# Patient Record
Sex: MALE | Race: WHITE | Employment: OTHER | URBAN - METROPOLITAN AREA
[De-identification: names, ages, dates, MRNs, and addresses within clinical notes are randomized per-mention and may not be internally consistent; named-entity substitution may affect disease eponyms.]

---

## 2017-12-19 ENCOUNTER — HOSPITAL ENCOUNTER (OUTPATIENT)
Dept: OTHER | Age: 67
Discharge: HOME | End: 2017-12-19
Attending: INTERNAL MEDICINE | Admitting: INTERNAL MEDICINE

## 2017-12-19 DIAGNOSIS — R06.02 BREATH SHORTNESS: ICD-10-CM

## 2018-08-14 ENCOUNTER — OFFICE VISIT (OUTPATIENT)
Dept: ORTHOPEDIC SURGERY | Age: 68
End: 2018-08-14

## 2018-08-14 VITALS
HEIGHT: 72 IN | SYSTOLIC BLOOD PRESSURE: 105 MMHG | HEART RATE: 82 BPM | DIASTOLIC BLOOD PRESSURE: 59 MMHG | BODY MASS INDEX: 20.99 KG/M2 | WEIGHT: 155 LBS

## 2018-08-14 DIAGNOSIS — M75.82 ROTATOR CUFF TENDINITIS, LEFT: Primary | ICD-10-CM

## 2018-08-14 DIAGNOSIS — M25.512 LEFT SHOULDER PAIN, UNSPECIFIED CHRONICITY: ICD-10-CM

## 2018-08-14 PROCEDURE — 99203 OFFICE O/P NEW LOW 30 MIN: CPT | Performed by: ORTHOPAEDIC SURGERY

## 2018-08-14 PROCEDURE — 20610 DRAIN/INJ JOINT/BURSA W/O US: CPT | Performed by: ORTHOPAEDIC SURGERY

## 2018-08-14 RX ORDER — MINOXIDIL 2.5 MG/1
2.5 TABLET ORAL DAILY
COMMUNITY

## 2018-08-14 RX ORDER — MELOXICAM 15 MG/1
15 TABLET ORAL DAILY
Qty: 30 TABLET | Refills: 3 | Status: SHIPPED | OUTPATIENT
Start: 2018-08-14 | End: 2019-02-28

## 2018-08-14 NOTE — PROGRESS NOTES
Depomedrol     NDC#: 9412-7567-03  Lot: [id number]  Expiration Date: 08/20  Dose: 2cc  Location: injection was given in Left  shoulder      Naropin (Ropivacain)    NDC#: 31269-965-85  Lot: [id number]  Expiration Date: 02/22  Dose:8cc  Location: injection was given in Left shoulder

## 2018-08-14 NOTE — PROGRESS NOTES
ADENOIDECTOMY       Social History     Social History    Marital status:      Spouse name: N/A    Number of children: N/A    Years of education: N/A     Occupational History    Not on file. Social History Main Topics    Smoking status: Former Smoker     Years: 13.00    Smokeless tobacco: Never Used    Alcohol use No    Drug use: No    Sexual activity: Not on file     Other Topics Concern    Not on file     Social History Narrative    No narrative on file       No Known Allergies  Current Outpatient Prescriptions on File Prior to Visit   Medication Sig Dispense Refill    traZODone (DESYREL) 50 MG tablet Take 50 mg by mouth nightly      ibuprofen (ADVIL) 200 MG CAPS Take 1 capsule by mouth 3 times daily.  lisinopril (PRINIVIL;ZESTRIL) 5 MG tablet Take 5 mg by mouth daily.  rosuvastatin (CRESTOR) 40 MG tablet Take 40 mg by mouth every evening.  pantoprazole (PROTONIX) 40 MG tablet Take 40 mg by mouth daily.  dicyclomine (BENTYL) 20 MG tablet Take 20 mg by mouth every 6 hours.  aspirin 81 MG tablet Take 81 mg by mouth daily.  GLUCOSAMINE HCL PO Take  by mouth. No current facility-administered medications on file prior to visit.           Review of Systems:  A 14 point review of systems available in the scanned medical record, dated 8/14/2018, under the media tab, as documented by the patient. The review is negative with the exception of those things mentioned in the HPI and Past Medical History. Vital Signs:  Vitals:    08/14/18 1352   BP: (!) 105/59   Pulse: 82       General Exam:   Well nourished, and groomed. Awake, Alert, Oriented to Person/Place/Time  No acute distress    Upper Extremity:    Left shoulder exam    Inspection:  No gross deformities, periscapular musculature is symmetry without atrophy, no obvious winging    Palpation: he is tender under the acromion and over the supraspinatus tendon. No biceps or AC tenderness.  He has tenderness over the posterior capsule that is worsened with cross body adduction. Subacromial crepitation that is not painful noted with internal rotation in abduction. Active/Passive ROM:     F: 160/170  A: 130/140  E: 45/50  I: T10/Tll    Strength: 4/5 strength testing of supraspinatus; no other appreciable deficits    Stability: negative sulcus sign, no evidence of instability    Neurovascular:   2+ Radial pulses with capillary refill brisk <2 seconds. 2/2 sensation to light touch in C5-T1 distributions tested. Special Tests:    Patient has a positive Jobes but negative neer and hawkin's examination  No appreciable lag signs  No labral signs. Right comparison shoulder exam    Inspection:  No gross deformities, periscapular musculature is symmetrical without atrophy, no obvious winging    Palpation: Nontender to palpation about the acromioclavicular joint, rotator cuff footprint or over the biceps groove, or any other bony architecture    Active/Passive ROM: full in forward flexion, abduction, external rotation with the elbow at the side, and internal rotation    Strength: 5/5 strength testing of deltoid, supraspinatus, infraspinatus, teres minor, and subscapularis    Stability: negative sulcus sign, no evidence of instability    Neurovascular: Neurovascularly intact      Imaging:       AP, Y, and Ax Lat of the left shoulder with 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 No acute fracture or dislocation.  Os Acromiale    Office Procedures:  Orders Placed This Encounter   Procedures    XR SHOULDER LEFT (MIN 2 VIEWS)     Order Specific Question:   Reason for exam:     Answer:   exam    Ambulatory referral to Physical Therapy     Referral Priority:   Routine     Referral Type:   Eval and Treat     Referral Reason:   Specialty Services Required     Requested Specialty:   Physical Therapy     Number of Visits Requested:   1    [place] ARTHROCENTESIS ASPIR&/INJ

## 2018-08-14 NOTE — LETTER
Shoulder Elbow Rehabilitation Referral    Patient Name: Gina Jha      YOB: 1950    Diagnosis: Subacromial impingement, bursitis and supraspinatus tendonitis    Precautions: none    Post Op Instructions:  [] Continuous passive motion (CPM)  [] Elbow range of motion  [] Exercise in plane of scapula  []  Strengthening     [] Pulley and instruction   [] Home exercise program (copy to patient)   [] Sling when arm at risk  [] Sling or brace at all times   [] AAROM: Forward elevation to              [] AAROM: External rotation  To      [] Isometric external rotator strengthening [] AAROM: internal rotation: up the back  [] Isometric abductor strengthening  [] AAROM: Internal abduction     [] Isometric internal rotator strengthening [] AAROM: cross-body adduction             Stretching:     Strengthening:  [x] Four quadrant (FE, ER, IR, CBA)  [x] Sleeper stretch    [x] Rotator cuff (ER, IR, Abd)  [] Forward Elevation    [x] External Rotators     [] External Rotation    [x] Internal Rotators  [] Internal Rotation: up/back   [x] Abductors     [] Internal Rotation: supine in abduction [x] Flexors  [x] Cross-body abduction    [x] Extensors  [] Pendulum (FE, Abd/Add, cw/ccw)  [x] Scapular Stabilizers   [] Wall-walking (FE, Abd)    [x] Shoulder shrugs     [] Table slides      [x] Rhomboid pinch  [] Elbow (flex, ext, pron, sup)    [x] Lat.  Pull downs     [] Medial epicondylitis program   [x] Forward punch   [] Lateral epicondylitis program   [x] Internal rotators     [] Progressive resistive exercises  [] Bench Press        [] Bench press plus  Activities:     [x] Lateral pull-downs  [] Rowing     [x] Progressive two-hand supine press  [] Stepper/Exercise bike   [x] Biceps: curls/supination  [] Swimming  [] Water exercises    Modalities:     Return to Sport:  [] Ultrasound     [] Plyometrics  [] Iontophoresis    [] Rhythmic stabilization  [] Moist heat     [] Core strengthening [] Massage     [] Sports specific program:      [] Cryotherapy      [] Electrical stimulation     [] Paraffin  [] Whirlpool  [] TENS    [x] Home exercise program (copy to patient). [x] Supervised physical therapy  Frequency: []  1x week  [x] 2x week  [] 3x week  [] Other:   Duration: [] 2 weeks   [] 4 weeks  [x] 6 weeks  [] Other:     Sincerely,    Ashleigh Lehman MD  Clinical Fellow in [address] [place] [address]    This dictation was performed with a verbal recognition program [facility]) and it was checked for errors. It is possible that there are still dictated errors within this office note. If so, please bring any errors to my attention for an addendum. All efforts were made to ensure that this office note is accurate. Attestation:  I, Dr. Jessica Elliott, personally supervised the Orthopaedic Sports Medicine Fellow in the evaluation and development of a treatment plan for this patient. I personally interviewed the patient and performed a physical examination. In addition, I discussed the patient's condition and treatment options with them. I have also reviewed and agree with the past medical, family and social history unless otherwise noted. All of the patient's questions were answered.

## 2018-09-11 ENCOUNTER — HOSPITAL ENCOUNTER (OUTPATIENT)
Dept: PHYSICAL THERAPY | Age: 68
Setting detail: THERAPIES SERIES
Discharge: HOME | End: 2018-09-11
Payer: COMMERCIAL

## 2018-09-11 PROCEDURE — G8982 BODY POS GOAL STATUS: HCPCS | Performed by: PHYSICAL THERAPIST

## 2018-09-11 PROCEDURE — 97161 PT EVAL LOW COMPLEX 20 MIN: CPT | Performed by: PHYSICAL THERAPIST

## 2018-09-11 PROCEDURE — G8981 BODY POS CURRENT STATUS: HCPCS | Performed by: PHYSICAL THERAPIST

## 2018-09-11 PROCEDURE — 97530 THERAPEUTIC ACTIVITIES: CPT | Performed by: PHYSICAL THERAPIST

## 2018-09-11 PROCEDURE — 97110 THERAPEUTIC EXERCISES: CPT | Performed by: PHYSICAL THERAPIST

## 2018-09-11 NOTE — FLOWSHEET NOTE
Special Tests Results/Comment   Kathrine-Christian Mild pain on L   Neers -   Painful Arc -   OBriens Mild pain on L   Speeds Slight discomfort on L   Apley's Scratch [place]: T10 on R, T 12 on L  BB: T4 on R, T3 on L   Belly Press Mild pain on L   Drop Arm Test -   Full Can -   Hornblower's -      Reflexes/Sensation:               [x]Dermatomes/Myotomes intact               [x]Reflexes equal and normal bilaterally              []Other:     Joint mobility:               [x]Normal               []Hypo              []Hyper     Palpation: TTP LH BT and medial border of scap, equal B     Functional Mobility/Transfers: Independent     Posture: Rounded shoulders      RESTRICTIONS/PRECAUTIONS:     Exercises/Interventions:   Exercise/Equipment Resistance/Repetitions Other comments   Stretching/PROM     Wand     Table Slides     UE [place]     Pulleys     Pendulum     BB IR 3x30\"         PRE's     Flexion     Abduction     External Rotation 3x10 2# Sidelying   Internal Rotation     Shrugs     EXT     Reverse Flys     Serratus     Prone T 3x10 0# Workout bench   Prone Y 3x10 0# Workout bench   Biceps     Triceps     Retraction          Cable Column/Theraband     External Rotation 3x10 green    Internal Rotation 3x10 black    Shrugs     Lats     Ext     Flex     Scapular Retraction     BIC     TRIC     PNF     \"W\" ER 3x10 green              Dynamic Stability          Plyoback            Plan for next session: progress as tolerated    Patient Education:  9/11/18 Pt educated on diagnosis, prognosis and PT plan of care. Educated on [address]. Pt questions were addressed and answered.     Therapeutic Exercise and NMR EXR:  [x] (11269) Provided verbal/tactile cueing for activities related to strengthening, flexibility, endurance, ROM  for improvements in scapular, scapulothoracic and UE control with self care, reaching, carrying, lifting, house/yardwork, driving/computer work.    [] (80399) Provided verbal/tactile cueing for activities

## 2018-09-11 NOTE — PLAN OF CARE
The [address] [place] [address] and [facility]      Physical Therapy Certification    Dear Referring Practitioner: Severo Peters MD,    We had the pleasure of evaluating the following patient for physical therapy services at [address] [place] [address]. A summary of our findings can be found in the initial assessment below. This includes our plan of care. If you have any questions or concerns regarding these findings, please do not hesitate to contact me at the office phone number checked above. Thank you for the referral.       Physician Signature:_______________________________Date:__________________  By signing above (or electronic signature), therapists plan is approved by physician      Patient: Jignesh Mann   : 1950   MRN: [id number]  Referring Physician: Referring Practitioner: Severo Peters MD      Evaluation Date: 2018      Medical Diagnosis Information:  Diagnosis: M75.82 (ICD-10-CM) - Rotator cuff tendinitis, left; M25.512 (ICD-10-CM) - Left shoulder pain, unspecified chronicity   Treatment Diagnosis: L shoulder pain, kyphotic posture, RC and trapezius weakness                                         Insurance information: PT Insurance Information: PT BENEFITS 2018 FACILITY/ [employer]/ EFFECTIVE 1-14-15/ ACTIVE/ DED 1500 MET/ PAYS 80%/ OOP 4500 MET 3062.07/60 VPCY/ 0 AUTH/ REF# VEDA [id number]/ 9-10-18 PAG    Precautions/ Contra-indications:   Latex Allergy:  [x]NO      []YES  Preferred Language for Healthcare:   [x]English       []other:    SUBJECTIVE: Patient stated complaint: Pt presents to PT with chronic L shoulder pain. He has had B SAD (R in , L about 12 years ago), but otherwise does not recall specific injury to the shoulder. Pt remains very active and he states that he began noticing bench pressing was getting hard. He typically can bench 125 lbs but had reached the point where event the bar was painful.  Pt states that he is able to complete all ADLs and IADLs, but had backed off on workouts and held all together until he came to PT. Pt saw Dr. Elva Hicks last week and had cortisone injection which he feels has helped some, notes a little more mobility (can reach behind back better) and a little less pain. Pt states his pain is very specific it, it is caused by reaching out to the side and reaching behind back, he does have occasional pain along the lateral proximal arm.    -instability  -clicking/catching  +night pain (on occasion when sleeping on L, but primarily sleeps on his R)    5/10 before injection, sharp grabbing pain  2-3/10 after injection    Hobbies: working out, tennis (pt tore HS in Feb 2017, went through PT but has not resumed tennis as he is afraid of reinjury)    Relevant Medical History: see intake form  Functional Disability Index:PT G-Codes  Functional Assessment Tool Used: UEFI  Score: 73/80=91.25% (8.75% deficit)  Functional Limitation: Changing and maintaining body position  Changing and Maintaining Body Position Current Status (): At least 1 percent but less than 20 percent impaired, limited or restricted  Changing and Maintaining Body Position Goal Status (): 0 percent impaired, limited or restricted    Pain Scale: 2-4/10  Easing factors: cortisone injection  Provocative factors: reaching from ABD positioning, reaching behind back    Type: []Constant   [x]Intermittent  []Radiating []Localized []other:     Numbness/Tingling: No    Occupation/School: Dermatologist    Living Status/Prior Level of Function: Independent with ADLs and IADLs.     OBJECTIVE:     ROM PROM AROM  Comment    L R L R    Flexion   155     Abduction   160     ER   90     IR   50         Strength L R Comment   Flexion 4/5 4/5    Abduction 4/5 4/5    ER 4-/5 4/5    IR 4+/5 4/5    Upper Trap 4-/5 4/5    Lower Trap 4-/5 4/5    Mid Trap 4-/5 4/5    Biceps 4+/5 4+/5    Triceps 4+/5 4+/5      Special Tests Results/Comment   Kathrine-Christian Mild pain on L of Motivation                        []Co-Morbidities              []Cognitive Function, education/learning barriers              []Environmental, home barriers              []profession/work barriers  []past PT/medical experience  [x]other:  Justification: Pt is motivated to get better and is willing to try PT and states that he will be compliant with HEP; however, he does not feel that PT is going help and he does not want to complete MD referral of 6 weeks. Falls Risk Assessment (30 days):   [x] Falls Risk assessed and no intervention required. [] Falls Risk assessed and Patient requires intervention due to being higher risk   TUG score (>12s at risk):     [] Falls education provided, including       G-Codes:  PT G-Codes  Functional Assessment Tool Used: UEFI  Score: 73/80=91.25% (8.75% deficit)  Functional Limitation: Changing and maintaining body position  Changing and Maintaining Body Position Current Status ():  At least 1 percent but less than 20 percent impaired, limited or restricted  Changing and Maintaining Body Position Goal Status (): 0 percent impaired, limited or restricted    ASSESSMENT:   Functional Impairments   []Noted spinal or UE joint hypomobility   []Noted spinal or UE joint hypermobility   [x]Decreased UE functional ROM   [x]Decreased UE functional strength   []Abnormal reflexes/sensation/myotomal/dermatomal deficits   []Decreased RC/scapular/core strength and neuromuscular control   []other:      Functional Activity Limitations (from functional questionnaire and intake)   []Reduced ability to tolerate prolonged functional positions   [x]Reduced ability or difficulty with changes of positions or transfers between positions   []Reduced ability to maintain good posture and demonstrate good body mechanics with sitting, bending, and lifting   [] Reduced ability or tolerance with driving and/or computer work   []Reduced ability to sleep   [x]Reduced ability to perform lifting, reaching, carrying tasks   []Reduced ability to tolerate impact through UE   [x]Reduced ability to reach behind back   []Reduced ability to  or hold objects   []Reduced ability to throw or toss an object   []other:      Participation Restrictions   [x]Reduced participation in self care activities   []Reduced participation in home management activities   []Reduced participation in work activities   []Reduced participation in social activities. [x]Reduced participation in sport / recreational activities. Classification:   []Signs/symptoms consistent with post-surgical status including decreased ROM, strength and function.   []Signs/symptoms consistent with joint sprain/strain   [x]Signs/symptoms consistent with shoulder impingement   []Signs/symptoms consistent with shoulder/elbow/wrist tendinopathy   []Signs/symptoms consistent with Rotator cuff tear   []Signs/symptoms consistent with labral tear   []Signs/symptoms consistent with postural dysfunction    []Signs/symptoms consistent with Glenohumeral IR Deficit - <45 degrees   []Signs/symptoms consistent with facet dysfunction of cervical/thoracic spine    []Signs/symptoms consistent with pathology which may benefit from Dry needling     []other:     Prognosis/Rehab Potential:      []Excellent   [x]Good    []Fair   []Poor    Tolerance of evaluation/treatment:    []Excellent   [x]Good    []Fair   []Poor    Physical Therapy Evaluation Complexity Justification  [x] A history of present problem with:  [x] no personal factors and/or comorbidities that impact the plan of care;  []1-2 personal factors and/or comorbidities that impact the plan of care  []3 personal factors and/or comorbidities that impact the plan of care  [x] An examination of body systems using standardized tests and measures addressing any of the following: body structures and functions (impairments), activity limitations, and/or participation restrictions;:  [] a total of 1-2 or more elements   [] a total of 3 or more elements   [x] a total of 4 or more elements   [x] A clinical presentation with:  [x] stable and/or uncomplicated characteristics   [] evolving clinical presentation with changing characteristics  [] unstable and unpredictable characteristics;   [x] Clinical decision making of [x] low, [] moderate, [] high complexity using standardized patient assessment instrument and/or measurable assessment of functional outcome. [x] EVAL (LOW) 74469 (typically 20 minutes face-to-face)  [] EVAL (MOD) 20124 (typically 30 minutes face-to-face)  [] EVAL (HIGH) 94954 (typically 45 minutes face-to-face)  [] RE-EVAL       PLAN:  Frequency/Duration:  1 days per week for 6 Weeks:  INTERVENTIONS:  [x] Therapeutic exercise including: strength training, ROM, for Upper extremity and core   [x]  NMR activation and proprioception for UE, scap and Core   [x] Manual therapy as indicated for shoulder, scapula and spine to include: Dry Needling/IASTM, STM, PROM, Gr I-IV mobilizations, manipulation. [x] Modalities as needed that may include: thermal agents, E-stim, Biofeedback, US, iontophoresis as indicated  [x] Patient education on joint protection, postural re-education, activity modification, progression of HEP. HEP instruction:   Initiated 9/11/18. Printed hand out given. Pt demonstrated proper form of each exercise and expressed verbal understanding of frequency and duration. GOALS:  Patient stated goal: Decrease pain    Therapist goals for Patient:   Short Term Goals: To be achieved in: 2 weeks  1. Independent in HEP and progression per patient tolerance, in order to prevent re-injury. 2. Patient will have a decrease in pain to facilitate improvement in movement, function, and ADLs as indicated by Functional Deficits. Long Term Goals: To be achieved in: 6 weeks  1. Disability index score of 10% or less for the UEFS to assist with reaching prior level of function.    2. Patient will demonstrate increased AROM to WNL to allow for proper joint functioning as indicated by patients Functional Deficits. 3. Patient will demonstrate an increase in LUE strength to 4+/5 to allow for proper functional mobility as indicated by patients Functional Deficits. 4. Patient will return to ADLs, IADLs and functional activities without increased symptoms or restriction. 5. Patient will tolerate progressive return to full gym program without increased symptoms or restriction. Electronically signed by:  Karina Donaldson PT, DPT  Physical Therapist  [id number]  [email or web address]

## 2018-09-14 ENCOUNTER — HOSPITAL ENCOUNTER (OUTPATIENT)
Dept: PHYSICAL THERAPY | Age: 68
Setting detail: THERAPIES SERIES
Discharge: HOME | End: 2018-09-14
Payer: COMMERCIAL

## 2018-09-14 PROCEDURE — 97110 THERAPEUTIC EXERCISES: CPT | Performed by: PHYSICAL THERAPIST

## 2018-09-14 PROCEDURE — 97530 THERAPEUTIC ACTIVITIES: CPT | Performed by: PHYSICAL THERAPIST

## 2018-09-14 NOTE — FLOWSHEET NOTE
[address] [facility]    [facility] Therapy Daily Treatment Note  Date:  2018    Patient Name:  Colin Mackenzie    :  1950  MRN: [id number]  Restrictions/Precautions:    Medical/Treatment Diagnosis Information:  · Diagnosis: M75.82 (ICD-10-CM) - Rotator cuff tendinitis, left; M25.512 (ICD-10-CM) - Left shoulder pain, unspecified chronicity  · Treatment Diagnosis: L shoulder pain, kyphotic posture, RC and trapezius weakness  Insurance/Certification information:  PT Insurance Information: PT BENEFITS 2018 FACILITY/ AETNA/ EFFECTIVE 1-14-15/ ACTIVE/ DED 1500 MET/ PAYS 80%/ OOP 4500 MET 3062. VPCY/ 0 AUTH/ REF# VEDA [id number]/ 9-10-18 PAG  Physician Information:  Referring Practitioner: Mayuri Mitchell MD  Plan of care signed (Y/N):     Date of Patient follow up with Physician: Not scheduled at this time    G-Code (if applicable):      Date G-Code Applied:  18  PT G-Codes  Functional Assessment Tool Used: UEFI  Score: 73/80=91.25% (8.75% deficit)  Functional Limitation: Changing and maintaining body position  Changing and Maintaining Body Position Current Status (): At least 1 percent but less than 20 percent impaired, limited or restricted  Changing and Maintaining Body Position Goal Status (): 0 percent impaired, limited or restricted    Progress Note: [x]  Yes  []  No  Next due by: Visit #10      Latex Allergy:  [x]NO      []YES  Preferred Language for Healthcare:   [x]English       []other:    Visit # Insurance Allowable   2 60     Pain level:  3/10     SUBJECTIVE:  Pt states that HEP went well. Pt states that his shoulder feels good today, still has pain with reaching.     OBJECTIVE:                ROM PROM AROM  Comment     L R L R     Flexion     155       Abduction     160       ER     90       IR     50             Strength L R Comment   Flexion 4/5 4/5     Abduction 4/5 4/5     ER 4-/5 4/5     IR 4+/5 4/5     Upper Trap 4-/5 4/5   reaching, carrying, lifting, house/yardwork, driving/computer work.    [] (86580) Provided verbal/tactile cueing for activities related to improving balance, coordination, kinesthetic sense, posture, motor skill, proprioception  to assist with  scapular, scapulothoracic and UE control with self care, reaching, carrying, lifting, house/yardwork, driving/computer work. Therapeutic Activities:    [] (52521 or 80486) Provided verbal/tactile cueing for activities related to improving balance, coordination, kinesthetic sense, posture, motor skill, proprioception and motor activation to allow for proper function of scapular, scapulothoracic and UE control with self care, carrying, lifting, driving/computer work.      Home Exercise Program:    [x] (46350) Reviewed/Progressed HEP activities related to strengthening, flexibility, endurance, ROM of scapular, scapulothoracic and UE control with self care, reaching, carrying, lifting, house/yardwork, driving/computer work  [] (05848) Reviewed/Progressed HEP activities related to improving balance, coordination, kinesthetic sense, posture, motor skill, proprioception of scapular, scapulothoracic and UE control with self care, reaching, carrying, lifting, house/yardwork, driving/computer work      Manual Treatments:    [] (33410) Provided manual therapy to mobilize soft tissue/joints of cervical/CT, scapular GHJ and UE for the purpose of modulating pain, promoting relaxation,  increasing ROM, reducing/eliminating soft tissue swelling/inflammation/restriction, improving soft tissue extensibility and allowing for proper ROM for normal function with self care, reaching, carrying, lifting, house/yardwork, driving/computer work    Modalities:  None    Charges:  Timed Code Treatment Minutes: 23   Total Treatment Minutes: 35     [] EVAL  [x] RH(68880) x  1   [] IONTO  [] NMR (77132) x      [] VASO  [] Manual (94099) x       [] Other:  [x] TA x  1    [] Mech Traction (43450)  [] ES(attended) (05559)      [] ES (un) (81844):     GOALS:  Patient stated goal: Decrease pain     Therapist goals for Patient:   Short Term Goals: To be achieved in: 2 weeks   1. Independent in HEP and progression per patient tolerance, in order to prevent re-injury. 2. Patient will have a decrease in pain to facilitate improvement in movement, function, and ADLs as indicated by Functional Deficits.     Long Term Goals: To be achieved in: 6 weeks  1. Disability index score of 10% or less for the UEFS to assist with reaching prior level of function. 2. Patient will demonstrate increased AROM to WNL to allow for proper joint functioning as indicated by patients Functional Deficits. 3. Patient will demonstrate an increase in LUE strength to 4+/5 to allow for proper functional mobility as indicated by patients Functional Deficits. 4. Patient will return to ADLs, IADLs and functional activities without increased symptoms or restriction. 5. Patient will tolerate progressive return to full gym program without increased symptoms or restriction.             Progression Towards Functional goals:  [] Patient is progressing as expected towards functional goals listed. [] Progression is slowed due to complexities listed. [] Progression has been slowed due to co-morbidities. [x] Plan just implemented, too soon to assess goals progression  [] Other:     ASSESSMENT:  Pt required verbal and tactile cues for proper set up during sidelying ER, prone T and standing TB IR but was then able to complete with proper form, pt reported improved comfort with proper form. Reported slight anterior shoulder discomfort with prone Ts and supine HABD. Initially performed standing HABD which caused anterior shoulder pain, pain was reduced with switch to supine to provide scap stability. Pt requires PT follow up to address ROM, strength and functional mobility deficits.     Treatment/Activity Tolerance:  [x] Patient tolerated treatment well [] Patient limited by fatique  [] Patient limited by pain  [] Patient limited by other medical complications  [] Other:     Prognosis: [x] Good [] Fair  [] Poor    Patient Requires Follow-up: [x] Yes  [] No    PLAN: See eval  [] Continue per plan of care [] Alter current plan (see comments)  [x] Plan of care initiated [] Hold pending MD visit [] Discharge    Electronically signed by: Guinevere Cowden PT, DPT  Physical Therapist  [id number]  [email or web address]

## 2018-09-21 ENCOUNTER — HOSPITAL ENCOUNTER (OUTPATIENT)
Dept: PHYSICAL THERAPY | Age: 68
Setting detail: THERAPIES SERIES
Discharge: HOME | End: 2018-09-21
Payer: COMMERCIAL

## 2018-09-21 NOTE — FLOWSHEET NOTE
[facility]    Physical Therapy  Cancellation/No-show Note  Patient Name:  Yonny Santizo  :  1950   Date:  2018  Cancelled visits to date: 1  No-shows to date: 0    For today's appointment patient:  [x]  Cancelled  []  Rescheduled appointment  []  No-show     Reason given by patient:  []  Patient ill  []  Conflicting appointment   []  No transportation    []  Conflict with work  []  No reason given  [x]  Other:     Comments:  Increased weights yesterday and is sore, needed a day of rest    Electronically signed by:  Janusz Villarreal, PT, DPT  Physical Therapist  [id number]  [email or web address]

## 2018-09-24 ENCOUNTER — HOSPITAL ENCOUNTER (OUTPATIENT)
Dept: PHYSICAL THERAPY | Age: 68
Setting detail: THERAPIES SERIES
Discharge: HOME | End: 2018-09-24
Payer: COMMERCIAL

## 2018-09-24 PROCEDURE — 97530 THERAPEUTIC ACTIVITIES: CPT | Performed by: PHYSICAL THERAPIST

## 2018-09-24 PROCEDURE — 97110 THERAPEUTIC EXERCISES: CPT | Performed by: PHYSICAL THERAPIST

## 2018-09-24 NOTE — FLOWSHEET NOTE
[address] [facility]    Physical Therapy Daily Treatment Note  Date:  2018    Patient Name:  Erika Whatley    :  1950  MRN: [id number]  Restrictions/Precautions:    Medical/Treatment Diagnosis Information:  · Diagnosis: M75.82 (ICD-10-CM) - Rotator cuff tendinitis, left; M25.512 (ICD-10-CM) - Left shoulder pain, unspecified chronicity  · Treatment Diagnosis: L shoulder pain, kyphotic posture, RC and trapezius weakness  Insurance/Certification information:  PT Insurance Information: PT BENEFITS 2018 FACILITY/ AETNA/ EFFECTIVE 1-14-15/ ACTIVE/ DED 1500 MET/ PAYS 80%/ OOP 4500 MET 3062. VPCY/ 0 AUTH/ REF# VEDA [id number]/ 9-10-18 PAG  Physician Information:  Referring Practitioner: Destiny Dunne MD  Plan of care signed (Y/N):     Date of Patient follow up with Physician: Not scheduled at this time    G-Code (if applicable):      Date G-Code Applied:  18  PT G-Codes  Functional Assessment Tool Used: UEFI  Score: 73/80=91.25% (8.75% deficit)  Functional Limitation: Changing and maintaining body position  Changing and Maintaining Body Position Current Status (): At least 1 percent but less than 20 percent impaired, limited or restricted  Changing and Maintaining Body Position Goal Status (): 0 percent impaired, limited or restricted    Progress Note: [x]  Yes  []  No  Next due by: Visit #10      Latex Allergy:  [x]NO      []YES  Preferred Language for Healthcare:   [x]English       []other:    Visit # Insurance Allowable   3 60     Pain level:  3/10     SUBJECTIVE:  Pt states reaching behind the back is feeling better, reaching out into ABD is still painful. Has increased weights at home. Has been doing ER with dumbbells in standing instead of sidelying.      OBJECTIVE:                ROM PROM AROM  Comment     L R L R     Flexion     155       Abduction     160       ER     90       IR     50             Strength L R Comment   Flexion scapular, scapulothoracic and UE control with self care, reaching, carrying, lifting, house/yardwork, driving/computer work.    [] (77146) Provided verbal/tactile cueing for activities related to improving balance, coordination, kinesthetic sense, posture, motor skill, proprioception  to assist with  scapular, scapulothoracic and UE control with self care, reaching, carrying, lifting, house/yardwork, driving/computer work. Therapeutic Activities:    [] (37175 or 28207) Provided verbal/tactile cueing for activities related to improving balance, coordination, kinesthetic sense, posture, motor skill, proprioception and motor activation to allow for proper function of scapular, scapulothoracic and UE control with self care, carrying, lifting, driving/computer work.      Home Exercise Program:    [x] (19065) Reviewed/Progressed HEP activities related to strengthening, flexibility, endurance, ROM of scapular, scapulothoracic and UE control with self care, reaching, carrying, lifting, house/yardwork, driving/computer work  [] (95927) Reviewed/Progressed HEP activities related to improving balance, coordination, kinesthetic sense, posture, motor skill, proprioception of scapular, scapulothoracic and UE control with self care, reaching, carrying, lifting, house/yardwork, driving/computer work      Manual Treatments:    [] (15046) Provided manual therapy to mobilize soft tissue/joints of cervical/CT, scapular GHJ and UE for the purpose of modulating pain, promoting relaxation,  increasing ROM, reducing/eliminating soft tissue swelling/inflammation/restriction, improving soft tissue extensibility and allowing for proper ROM for normal function with self care, reaching, carrying, lifting, house/yardwork, driving/computer work    Modalities:  None    Charges:  Timed Code Treatment Minutes: 32   Total Treatment Minutes: 32     [] EVAL  [x] YZ(58442) x  1   [] IONTO  [] NMR (40465) x      [] VASO  [] Manual ([phone]) x       []

## 2018-12-17 ENCOUNTER — OFFICE VISIT (OUTPATIENT)
Dept: ORTHOPEDIC SURGERY | Age: 68
End: 2018-12-17

## 2018-12-17 VITALS
WEIGHT: 152 LBS | BODY MASS INDEX: 20.59 KG/M2 | HEIGHT: 72 IN | DIASTOLIC BLOOD PRESSURE: 62 MMHG | SYSTOLIC BLOOD PRESSURE: 126 MMHG

## 2018-12-17 DIAGNOSIS — S76.319A HAMSTRING TEAR: Primary | ICD-10-CM

## 2018-12-17 PROCEDURE — 99999 PR OFFICE/OUTPT VISIT,PROCEDURE ONLY: CPT | Performed by: ORTHOPAEDIC SURGERY

## 2018-12-17 NOTE — PROGRESS NOTES
 None     Current Outpatient Prescriptions   Medication Sig Dispense Refill    minoxidil (LONITEN) 2.5 MG tablet Take 2.5 mg by mouth daily      meloxicam (MOBIC) 15 MG tablet Take 1 tablet by mouth daily 30 tablet 3    traZODone (DESYREL) 50 MG tablet Take 50 mg by mouth nightly      ibuprofen (ADVIL) 200 MG CAPS Take 1 capsule by mouth 3 times daily.  lisinopril (PRINIVIL;ZESTRIL) 5 MG tablet Take 5 mg by mouth daily.  rosuvastatin (CRESTOR) 40 MG tablet Take 40 mg by mouth every evening.  pantoprazole (PROTONIX) 40 MG tablet Take 40 mg by mouth daily.  dicyclomine (BENTYL) 20 MG tablet Take 20 mg by mouth every 6 hours.  aspirin 81 MG tablet Take 81 mg by mouth daily.  GLUCOSAMINE HCL PO Take  by mouth. No current facility-administered medications for this visit. No Known Allergies    REVIEW OF SYSTEMS:   No rashes today  No new numbness  No tingling  No fevers  No depression  No new onset of pain        Pertinent items are noted in HPI  Review of systems reviewed from Patient History Form dated on 12/17/18 and available in the patient's chart under the Media tab. Examination:    General Exam:    Vitals: Blood pressure 126/62, height 6' (1.829 m), weight 152 lb (68.9 kg). Constitutional: Patient is adequately groomed with no evidence of malnutrition  Mental Status: The patient is oriented to time, place and person. The patient's mood and affect are appropriate. Lymphatic: The lymphatic examination bilaterally reveals all areas to be without enlargement or induration. Vascular: Examination reveals no swelling or calf tenderness. Peripheral pulses are palpable and 2+. Neurological: The patient has good coordination. There is no weakness or sensory deficit. Skin:    Head/Neck: inspection reveals no rashes, ulcerations or lesions. Trunk:  inspection reveals no rashes, ulcerations or lesions.   Right

## 2019-02-28 ENCOUNTER — OFFICE VISIT (OUTPATIENT)
Dept: SURGERY | Age: 69
End: 2019-02-28
Payer: MEDICARE

## 2019-02-28 ENCOUNTER — HOSPITAL ENCOUNTER (OUTPATIENT)
Dept: MAMMOGRAPHY | Age: 69
Discharge: HOME | End: 2019-02-28
Payer: MEDICARE

## 2019-02-28 VITALS
WEIGHT: 155.4 LBS | OXYGEN SATURATION: 100 % | SYSTOLIC BLOOD PRESSURE: 112 MMHG | DIASTOLIC BLOOD PRESSURE: 68 MMHG | BODY MASS INDEX: 21.08 KG/M2 | TEMPERATURE: 97.3 F | HEART RATE: 87 BPM

## 2019-02-28 DIAGNOSIS — N62 GYNECOMASTIA: ICD-10-CM

## 2019-02-28 DIAGNOSIS — N63.0 BREAST MASS IN MALE: ICD-10-CM

## 2019-02-28 DIAGNOSIS — N63.0 BREAST MASS IN MALE: Primary | ICD-10-CM

## 2019-02-28 PROCEDURE — 3017F COLORECTAL CA SCREEN DOC REV: CPT | Performed by: SURGERY

## 2019-02-28 PROCEDURE — 1036F TOBACCO NON-USER: CPT | Performed by: SURGERY

## 2019-02-28 PROCEDURE — 4040F PNEUMOC VAC/ADMIN/RCVD: CPT | Performed by: SURGERY

## 2019-02-28 PROCEDURE — 99203 OFFICE O/P NEW LOW 30 MIN: CPT | Performed by: SURGERY

## 2019-02-28 PROCEDURE — 1123F ACP DISCUSS/DSCN MKR DOCD: CPT | Performed by: SURGERY

## 2019-02-28 PROCEDURE — G8427 DOCREV CUR MEDS BY ELIG CLIN: HCPCS | Performed by: SURGERY

## 2019-02-28 PROCEDURE — 77066 DX MAMMO INCL CAD BI: CPT

## 2019-02-28 PROCEDURE — G8484 FLU IMMUNIZE NO ADMIN: HCPCS | Performed by: SURGERY

## 2019-02-28 PROCEDURE — 1101F PT FALLS ASSESS-DOCD LE1/YR: CPT | Performed by: SURGERY

## 2019-02-28 PROCEDURE — G8420 CALC BMI NORM PARAMETERS: HCPCS | Performed by: SURGERY

## 2019-02-28 ASSESSMENT — ENCOUNTER SYMPTOMS
BLOOD IN STOOL: 0
COUGH: 0
ABDOMINAL PAIN: 0
SHORTNESS OF BREATH: 0

## 2019-09-26 ENCOUNTER — OFFICE VISIT (OUTPATIENT)
Dept: ORTHOPEDIC SURGERY | Age: 69
End: 2019-09-26
Payer: MEDICARE

## 2019-09-26 VITALS
BODY MASS INDEX: 21.05 KG/M2 | HEIGHT: 72 IN | WEIGHT: 155.42 LBS | HEART RATE: 67 BPM | SYSTOLIC BLOOD PRESSURE: 126 MMHG | DIASTOLIC BLOOD PRESSURE: 67 MMHG

## 2019-09-26 DIAGNOSIS — M25.512 LEFT SHOULDER PAIN, UNSPECIFIED CHRONICITY: Primary | ICD-10-CM

## 2019-09-26 DIAGNOSIS — M25.612 SHOULDER STIFFNESS, LEFT: ICD-10-CM

## 2019-09-26 PROBLEM — R10.13 DYSPEPSIA: Status: ACTIVE | Noted: 2019-09-26

## 2019-09-26 PROBLEM — M17.11 PRIMARY OSTEOARTHRITIS OF RIGHT KNEE: Status: ACTIVE | Noted: 2017-07-20

## 2019-09-26 PROBLEM — M16.10 PRIMARY OSTEOARTHRITIS OF HIP: Status: ACTIVE | Noted: 2017-08-24

## 2019-09-26 PROBLEM — K29.00 ACUTE SUPERFICIAL GASTRITIS WITHOUT HEMORRHAGE: Status: ACTIVE | Noted: 2019-09-26

## 2019-09-26 PROBLEM — I51.89 DIASTOLIC DYSFUNCTION: Status: ACTIVE | Noted: 2019-09-26

## 2019-09-26 PROCEDURE — 3017F COLORECTAL CA SCREEN DOC REV: CPT | Performed by: ORTHOPAEDIC SURGERY

## 2019-09-26 PROCEDURE — 1036F TOBACCO NON-USER: CPT | Performed by: ORTHOPAEDIC SURGERY

## 2019-09-26 PROCEDURE — 99214 OFFICE O/P EST MOD 30 MIN: CPT | Performed by: ORTHOPAEDIC SURGERY

## 2019-09-26 PROCEDURE — 4040F PNEUMOC VAC/ADMIN/RCVD: CPT | Performed by: ORTHOPAEDIC SURGERY

## 2019-09-26 PROCEDURE — G8420 CALC BMI NORM PARAMETERS: HCPCS | Performed by: ORTHOPAEDIC SURGERY

## 2019-09-26 PROCEDURE — 1123F ACP DISCUSS/DSCN MKR DOCD: CPT | Performed by: ORTHOPAEDIC SURGERY

## 2019-09-26 PROCEDURE — 20610 DRAIN/INJ JOINT/BURSA W/O US: CPT | Performed by: ORTHOPAEDIC SURGERY

## 2019-09-26 PROCEDURE — G8427 DOCREV CUR MEDS BY ELIG CLIN: HCPCS | Performed by: ORTHOPAEDIC SURGERY

## 2019-09-26 RX ORDER — ROSUVASTATIN CALCIUM 40 MG/1
40 TABLET, COATED ORAL
COMMUNITY

## 2019-09-26 RX ORDER — SODIUM PHOSPHATE,MONO-DIBASIC 19G-7G/118
ENEMA (ML) RECTAL
COMMUNITY

## 2019-09-26 RX ORDER — MELOXICAM 15 MG/1
15 TABLET ORAL DAILY
Qty: 90 TABLET | Refills: 1 | Status: SHIPPED | OUTPATIENT
Start: 2019-09-26 | End: 2022-06-30

## 2019-09-26 RX ORDER — FERROUS SULFATE 325(65) MG
325 TABLET ORAL
COMMUNITY

## 2019-09-26 RX ORDER — LISINOPRIL 10 MG/1
10 TABLET ORAL
COMMUNITY

## 2019-09-26 RX ORDER — TRAZODONE HYDROCHLORIDE 100 MG/1
TABLET ORAL
Refills: 5 | COMMUNITY
Start: 2019-07-09

## 2019-09-26 NOTE — PROGRESS NOTES
Patient has had no medical changes since last evaluated    Depomedrol     NDC#: 2413-0889-19  Lot: [id number]  Expiration Date: 3/2021  Dose: 2cc  Location: injection was given in Left  shoulder      Lido    NDC#: 3474-0809-97  Lot: [id number]  Expiration Date: 4/1/21  Dose:8cc  Location: injection was given in Left shoulder

## 2019-10-03 ENCOUNTER — HOSPITAL ENCOUNTER (OUTPATIENT)
Dept: PHYSICAL THERAPY | Age: 69
Setting detail: THERAPIES SERIES
End: 2019-10-03
Payer: MEDICARE

## 2019-10-09 ENCOUNTER — HOSPITAL ENCOUNTER (OUTPATIENT)
Dept: PHYSICAL THERAPY | Age: 69
Setting detail: THERAPIES SERIES
Discharge: HOME | End: 2019-10-09
Payer: MEDICARE

## 2019-10-09 PROCEDURE — 97161 PT EVAL LOW COMPLEX 20 MIN: CPT | Performed by: PHYSICAL THERAPIST

## 2019-10-09 PROCEDURE — 97110 THERAPEUTIC EXERCISES: CPT | Performed by: PHYSICAL THERAPIST

## 2019-12-11 ENCOUNTER — OFFICE VISIT (OUTPATIENT)
Dept: ORTHOPEDIC SURGERY | Age: 69
End: 2019-12-11
Payer: MEDICARE

## 2019-12-11 VITALS
SYSTOLIC BLOOD PRESSURE: 126 MMHG | HEART RATE: 67 BPM | HEIGHT: 72 IN | BODY MASS INDEX: 21.05 KG/M2 | WEIGHT: 155.42 LBS | DIASTOLIC BLOOD PRESSURE: 67 MMHG

## 2019-12-11 DIAGNOSIS — M25.512 LEFT SHOULDER PAIN, UNSPECIFIED CHRONICITY: Primary | ICD-10-CM

## 2019-12-11 DIAGNOSIS — M67.912 ROTATOR CUFF DISORDER, LEFT: ICD-10-CM

## 2019-12-11 PROCEDURE — 1123F ACP DISCUSS/DSCN MKR DOCD: CPT | Performed by: ORTHOPAEDIC SURGERY

## 2019-12-11 PROCEDURE — 4040F PNEUMOC VAC/ADMIN/RCVD: CPT | Performed by: ORTHOPAEDIC SURGERY

## 2019-12-11 PROCEDURE — 3017F COLORECTAL CA SCREEN DOC REV: CPT | Performed by: ORTHOPAEDIC SURGERY

## 2019-12-11 PROCEDURE — G8484 FLU IMMUNIZE NO ADMIN: HCPCS | Performed by: ORTHOPAEDIC SURGERY

## 2019-12-11 PROCEDURE — G8420 CALC BMI NORM PARAMETERS: HCPCS | Performed by: ORTHOPAEDIC SURGERY

## 2019-12-11 PROCEDURE — 1036F TOBACCO NON-USER: CPT | Performed by: ORTHOPAEDIC SURGERY

## 2019-12-11 PROCEDURE — 99213 OFFICE O/P EST LOW 20 MIN: CPT | Performed by: ORTHOPAEDIC SURGERY

## 2019-12-11 PROCEDURE — G8427 DOCREV CUR MEDS BY ELIG CLIN: HCPCS | Performed by: ORTHOPAEDIC SURGERY

## 2021-01-20 ENCOUNTER — OFFICE VISIT (OUTPATIENT)
Dept: ORTHOPEDIC SURGERY | Age: 71
End: 2021-01-20
Payer: MEDICARE

## 2021-01-20 VITALS — BODY MASS INDEX: 20.32 KG/M2 | HEIGHT: 72 IN | WEIGHT: 150 LBS | TEMPERATURE: 97.1 F

## 2021-01-20 DIAGNOSIS — M75.82 TENDINITIS OF LEFT ROTATOR CUFF: Primary | ICD-10-CM

## 2021-01-20 DIAGNOSIS — M75.102 TEAR OF LEFT ROTATOR CUFF, UNSPECIFIED TEAR EXTENT, UNSPECIFIED WHETHER TRAUMATIC: ICD-10-CM

## 2021-01-20 DIAGNOSIS — M75.42 ROTATOR CUFF IMPINGEMENT SYNDROME OF LEFT SHOULDER: ICD-10-CM

## 2021-01-20 DIAGNOSIS — M25.512 LEFT SHOULDER PAIN, UNSPECIFIED CHRONICITY: ICD-10-CM

## 2021-01-20 PROCEDURE — 1123F ACP DISCUSS/DSCN MKR DOCD: CPT | Performed by: ORTHOPAEDIC SURGERY

## 2021-01-20 PROCEDURE — 4040F PNEUMOC VAC/ADMIN/RCVD: CPT | Performed by: ORTHOPAEDIC SURGERY

## 2021-01-20 PROCEDURE — G8420 CALC BMI NORM PARAMETERS: HCPCS | Performed by: ORTHOPAEDIC SURGERY

## 2021-01-20 PROCEDURE — G8484 FLU IMMUNIZE NO ADMIN: HCPCS | Performed by: ORTHOPAEDIC SURGERY

## 2021-01-20 PROCEDURE — 99213 OFFICE O/P EST LOW 20 MIN: CPT | Performed by: ORTHOPAEDIC SURGERY

## 2021-01-20 PROCEDURE — 1036F TOBACCO NON-USER: CPT | Performed by: ORTHOPAEDIC SURGERY

## 2021-01-20 PROCEDURE — 20610 DRAIN/INJ JOINT/BURSA W/O US: CPT | Performed by: ORTHOPAEDIC SURGERY

## 2021-01-20 PROCEDURE — G8427 DOCREV CUR MEDS BY ELIG CLIN: HCPCS | Performed by: ORTHOPAEDIC SURGERY

## 2021-01-20 PROCEDURE — 3017F COLORECTAL CA SCREEN DOC REV: CPT | Performed by: ORTHOPAEDIC SURGERY

## 2021-01-20 NOTE — LETTER
Physical Therapy Rehabilitation Referral    Patient Name:  Anibal Lopez      YOB: 1950    Diagnosis:    1. Left shoulder pain, unspecified chronicity    2. Rotator cuff impingement syndrome of left shoulder    3. Tear of left rotator cuff, unspecified tear extent, unspecified whether traumatic    4. Tendinitis of left rotator cuff        Precautions:     [x] Evaluate and Treat    Post Op Instructions:  [] Continuous passive motion (CPM) [] Elbow ROM  [x] Exercise in plane of scapula  []  Strengthening     [x] Pulley and instruction   [x] Home exercise program (copy to patient)   [] Sling when arm at risk  [] Sling or brace at all times   [] AAROM: Forward elevation to  140            [] AAROM: External rotation  To  40    [x] Isometric external rotator strengthening [] AAROM: internal rotation: up the back  [x] Isometric abductor strengthening  [] AAROM: Internal abduction   [] Isometric internal rotator strengthening [] AAROM: cross-body adduction             Stretching:     Strengthening:  [x] Four quadrant (FE, ER, IR, CBA)  [x] Rotator cuff (ER, IR, Abd)  [x] Forward Elevation    [] External Rotators     [x] External Rotation    [] Internal Rotators  [x] Internal Rotation: up/back   [] Abductors     [x] Internal Rotation: supine in abduction  [x] Sleeper Stretch    [] Flexors  [x] Cross-body abduction    [] Extensors  [x] Pendulum (FE, Abd/Add, cw/ccw)  [x] Scapular Stabilizers   [x] Wall-walking (FE, Abd)        [x] Shoulder shrugs     [x] Table slides (FE)                [x] Rhomboid pinch  [] Elbow (flex, ext, pron, sup)        [] Lat.  Pull downs     [] Medial epicondylitis program       [] Forward punch   [] Lateral epicondylitis program       [] Internal rotators     [] Progressive resistive exercises  [] Bench Press        [] Bench press plus  Activities:     [] Lateral pull-downs  [] Rowing     [] Progressive two-hand supine press [] Stepper/Exercise bike   [x] Biceps: curls/supination  [] Swimming  [] Water exercises    Modalities:     Return to Sport:  [x] Of Choice      [] Plyometrics  [] Ultrasound     [] Rhythmic stabilization  [] Iontophoresis    [] Core strengthening   [] Moist heat     [] Sports specific program:   [] Massage         [x] Cryotherapy      [] Electrical stimulation     [] Paraffin  [] Whirlpool  [] TENS    [x] Home exercise program (copy to patient). Perform exercises for:   15     minutes    3      times/day  [x] Supervised physical therapy  Frequency: []  1x week  [x] 2x week  [] 3x week  [] Other:   Duration: [] 2 weeks   [] 4 weeks  [x] 6 weeks  [] Other:     Additional Instructions:     Leo Reyes MD, PhD

## 2021-01-20 NOTE — PROGRESS NOTES
Review of Systems   Cardiovascular:        High blood pressure    Genitourinary:        Kidney stones / kidney disease    All other systems reviewed and are negative.

## 2021-01-20 NOTE — PROGRESS NOTES
[address] [facility]  History and Physical  Shoulder Pain    Date:  2021    Name:  Dawn Vega  Address:  [address] [place], [place] [address]  [address] [facility] [place] [address]    :  1950      Age:   78 y.o.    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 He is a longtime patient of ours and has not been seen in our office since 2019. He presents today regarding his left shoulder pain. The patient had been recovering from a knee surgery that required him to use crutches. As he was coming down a set of stairs he lost his balance and fell onto his left shoulder. This happened nearly 6 weeks ago. He was giving it some time for it to improve however the pain continues to stay is persistent. The pain is quite aggravating and can disrupt his sleep at nighttime. He reports its worse in the morning when he wakes up. He has difficulty raising it overhead. He reports once he starts to move his shoulder more more the pain does subside a bit. He does describe some pain on arm descent. He denies any numbness or tingling. Of note he did have an MRI from 2019 which showed a partial rotator cuff tear at the time. He was treated conservatively and responded quite well to a corticosteroid injection. Pain Assessment  Location of Pain: Shoulder  Location Modifiers: Left  Severity of Pain: 5  Quality of Pain: Aching  Duration of Pain: Persistent  Frequency of Pain: Constant  Aggravating Factors:  Other (Comment)(first waking up)  Relieving Factors: Rest  Result of Injury: Yes  Work-Related Injury: No  Are there other pain locations you wish to document?: No    Review of Systems   Cardiovascular:        High blood pressure    Genitourinary:        Kidney stones / kidney disease    All other views of the left  shoulder including True AP in internal and external and axillary lateral were taken in our office today reveal no fractures, dislocations, visible tumors, or signs of acute trauma. Self assessment questionnaires including ASES and Simple Shoulder Test were completed today. Assessment:  Jojo Cordova is a 78 y.o. male left shoulder pain related to rotator cuff tendonitis. A rotator cuff tear is less likely. Impression:  Encounter Diagnoses   Name Primary?  Left shoulder pain, unspecified chronicity     Rotator cuff impingement syndrome of left shoulder     Tear of left rotator cuff, unspecified tear extent, unspecified whether traumatic     Tendinitis of left rotator cuff Yes       Office Procedures:  Orders Placed This Encounter   Procedures    XR SHOULDER LEFT (MIN 2 VIEWS)     Standing Status:   Future     Number of Occurrences:   1     Standing Expiration Date:   1/20/2022    NE ARTHROCENTESIS ASPIR&/INJ MAJOR JT/BURSA W/O US     80 mg Depomedrol with 8 cc 1% Lidocaine in 10cc syringe with 25G (22G) needle       Plan:   We discussed some of the challenges 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 His physical exam does show that he continues to maintain his strength. We feel that it is less likely that he would have a large tear in his left shoulder based on the exam.  However without an MRI we are unable to rule out a medium or small rotator cuff tear. We feel that it is reasonable to stay conservative at this time. We recommend doing a corticosteroid injection to get his pain in better control. This can also help cut down the inflammation. Additionally recommend that he meet with a physical therapist once or twice to get a home exercise program together. He may also use topical NSAID such as Voltaren cream.  All his questions were fully answered today. We will see him back in 4 weeks to ensure that he is on the right track.     After discussing the risks and benefits of a corticosteroid injection, Kristan Varela did state an understanding and gave verbal consent to proceed. After cleansing the injection site with Chlora-prep and using aseptic techniques,  2 CC of Depo Medrol 40mg/ml and 8 CC of 1% lidocaine were injected in the left glenohumeral and subacromial space. He  tolerated the procedure well with no immediate adverse sequelae after the injection. A bandage was placed over the injection site. Appropriate post injections instructions were given to the patient. 1/20/2021  4:32 PM      Nichole Roberson PA-C  Orthopaedic Sports Medicine Physician Assistant    During this examination, Nichole RIBERA PA-C, functioned as a scribe for Dr. Amaury Caldwell. This dictation was performed with a verbal recognition program (DRAGON) and it was checked for errors. It is possible that there are still dictated errors within this office note. If so, please bring any errors to my attention for an addendum. All efforts were made to ensure that this office note is accurate.  ____________  I, Dr. Amaury Caldwell, personally performed the services described in this documentation as described by Nichole Roberson PA-C in my presence, and it is both accurate and complete. Leo Man MD, PhD  1/20/2021

## 2021-01-26 ENCOUNTER — HOSPITAL ENCOUNTER (OUTPATIENT)
Dept: PHYSICAL THERAPY | Age: 71
Setting detail: THERAPIES SERIES
Discharge: HOME | End: 2021-01-26
Payer: MEDICARE

## 2021-01-26 PROCEDURE — 97161 PT EVAL LOW COMPLEX 20 MIN: CPT | Performed by: PHYSICAL THERAPIST

## 2021-01-26 PROCEDURE — 97110 THERAPEUTIC EXERCISES: CPT | Performed by: PHYSICAL THERAPIST

## 2021-01-26 PROCEDURE — 97530 THERAPEUTIC ACTIVITIES: CPT | Performed by: PHYSICAL THERAPIST

## 2021-01-26 NOTE — PLAN OF CARE
The [address] [place] [address] and Randal Perdomo      Physical Therapy Certification    Dear Referring Practitioner: Teresa Hughes MD,    We had the pleasure of evaluating the following patient for physical therapy services at [address] [place] [address]. A summary of our findings can be found in the initial assessment below. This includes our plan of care. If you have any questions or concerns regarding these findings, please do not hesitate to contact me at the office phone number checked above. Thank you for the referral.       Physician Signature:_______________________________Date:__________________  By signing above (or electronic signature), therapists plan is approved by physician      Patient: Ilan Morton   : 1950   MRN: [id number]  Referring Physician: Referring Practitioner: Teresa Hughes MD      Evaluation Date: 2021      Medical Diagnosis Information:  Diagnosis: F16.295 (ICD-10-CM) - Left shoulder pain;M75.100 (ICD-10-CM) - Tear of rotator cuff;M75.82 (ICD-10-CM) - Tendonitis of left rotator cuff   Treatment Diagnosis: M25.512 L Shoulder Pain; R53.1 Weakness                                         Insurance information: PT Insurance Information: [place] - [place]    Precautions/ Contra-indications: HTN    C-SSRS Triggered by Intake questionnaire (Past 2 wk assessment):   [x] No, Questionnaire did not trigger screening.   [] Yes, Patient intake triggered further evaluation      [] C-SSRS Screening completed  [] PCP notified via Plan of Care  [] Emergency services notified     Latex Allergy:  [x]NO      []YES  Preferred Language for Healthcare:   [x]English       []other:    SUBJECTIVE: Patient stated complaint: Pt has had a few year hx of L shoulder pain. Was on crutches for quad tendon tear and had a fall on the stairs and he strained the shoulder.  Was hoping it would get better but did not improve as well as he wanted and was still restricted with normal daily activity. Saw Dr. Tee Rodrigez last week and got a cortisone injection which eliminated almost all of symptoms at this time. MD recommended 1 visit for HEP. 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 Enjoys tennis, jogging, elliptical.     Living Status/Prior Level of Function: Independent with ADLs and IADLs, work, household activity. OBJECTIVE:     ROM PROM AROM  Comment    L R L R    Flexion   130 140    Abduction   140 140    ER 60 90 T2 T2    IR 50 50 L2 L2    Other  (cervical)        Other             Strength L R Comment   Flexion 4+ 5    Abduction 4+ 5    ER 4 4+    IR 5 5    Lower Trap 3+ 4    Mid Trap 4- 4    Biceps 5 5    Triceps 5 5      Special Tests Results/Comment   Chisholm-Christian Pos   Neers Neg   Speeds Neg   OBriens Neg   Apprehension  Neg   Load & Shift  Neg       Reflexes/Sensation:               [x]Dermatomes/Myotomes intact               []Reflexes equal and normal bilaterally               []Other:     Joint mobility:               []Normal               [x]Hypo - inf glide              []Hyper     Palpation: minimal TTP     Functional Mobility/Transfers: mod ind     Posture: fair - rounded shoulders, forward head     Bandages/Dressings/Incisions: n/a     Gait: (include devices/WB status): antalgic d/t recovering from quad tendon repair     Orthopedic Special Tests: see above                       [x] Patient history, allergies, meds reviewed. Medical chart reviewed. See intake form.       Review Of Systems (ROS):  [x]Performed Review of systems (Integumentary, CardioPulmonary, Neurological) by intake and observation. Intake form has been scanned into medical record. Patient has been instructed to contact their primary care physician regarding ROS issues if not already being addressed at this time. Co-morbidities/Complexities (which will affect course of rehabilitation):   []None              Arthritic conditions   []Rheumatoid arthritis (M05.9)  []Osteoarthritis (M19.91)    Cardiovascular conditions   [x]Hypertension (I10)  []Hyperlipidemia (E78.5)  []Angina pectoris (I20)  []Atherosclerosis (I70)    Musculoskeletal conditions   []Disc pathology   []Congenital spine pathologies   [x]Prior surgical intervention  []Osteoporosis (M81.8)  []Osteopenia (M85.8)   Endocrine conditions   []Hypothyroid (E03.9)  []Hyperthyroid Gastrointestinal conditions   []Constipation (F96.39)    Metabolic conditions   []Morbid obesity (E66.01)  []Diabetes type 1(E10.65) or 2 (E11.65)   []Neuropathy (G60.9)      Pulmonary conditions   []Asthma (J45)  []Coughing   []COPD (J44.9)    Psychological Disorders  []Anxiety (F41.9)  []Depression (F32.9)   []Other:    []Other:            Barriers to/and or personal factors that will affect rehab potential:              []Age  []Sex              []Motivation/Lack of Motivation                        []Co-Morbidities              []Cognitive Function, education/learning barriers              []Environmental, home barriers              []profession/work barriers  []past PT/medical experience  []other:  Justification:      Falls Risk Assessment (30 days):   [x] Falls Risk assessed and no intervention required.   [] Falls Risk assessed and Patient requires intervention due to being higher risk   TUG score (>12s at risk):     [] Falls education provided, including         ASSESSMENT:   Functional Impairments              []Noted spinal or UE joint hypomobility              []Noted spinal or UE joint hypermobility              [x]Decreased UE functional ROM              []Decreased UE functional strength              []Abnormal reflexes/sensation/myotomal/dermatomal deficits              [x]Decreased RC/scapular/core strength and neuromuscular control              []other:       Functional Activity Limitations (from functional questionnaire and intake)              []Reduced ability to tolerate prolonged functional positions              []Reduced ability or difficulty with changes of positions or transfers between positions              []Reduced ability to maintain good posture and demonstrate good body mechanics with sitting, bending, and lifting              [] Reduced ability or tolerance with driving and/or computer work              []Reduced ability to sleep              [x]Reduced ability to perform lifting, reaching, carrying tasks              []Reduced ability to tolerate impact through UE              []Reduced ability to reach behind back              []Reduced ability to  or hold objects              []Reduced ability to throw or toss an object              []other:       Participation Restrictions              []Reduced participation in self care activities              [x]Reduced participation in home management activities              []Reduced participation in work activities              []Reduced participation in social activities. [x]Reduced participation in sport / recreational activities. Classification:              []Signs/symptoms consistent with post-surgical status including decreased ROM, strength and function.   [x]Signs/symptoms consistent with joint sprain/strain              [x]Signs/symptoms consistent with shoulder impingement              []Signs/symptoms consistent with shoulder/elbow/wrist tendinopathy              []Signs/symptoms consistent with Rotator cuff tear              []Signs/symptoms consistent with labral tear              []Signs/symptoms consistent with postural dysfunction                         []Signs/symptoms consistent with Glenohumeral IR Deficit - <45 degrees              []Signs/symptoms consistent with facet dysfunction of cervical/thoracic spine                         []Signs/symptoms consistent with pathology which may benefit from Dry needling                []other:      Prognosis/Rehab Potential:                                       []Excellent              [x]Good                 []Fair              []Poor     Tolerance of evaluation/treatment:               []Excellent              [x]Good                 []Fair              []Poor     Physical Therapy Evaluation Complexity Justification  [x] A history of present problem with:  [] no personal factors and/or comorbidities that impact the plan of care;  [x]1-2 personal factors and/or comorbidities that impact the plan of care  []3 personal factors and/or comorbidities that impact the plan of care  [x] An examination of body systems using standardized tests and measures addressing any of the following: body structures and functions (impairments), activity limitations, and/or participation restrictions;:  [] a total of 1-2 or more elements   [x] a total of 3 or more elements   [] a total of 4 or more elements   [x] A clinical presentation with:  [x] stable and/or uncomplicated characteristics   [] evolving clinical presentation with changing characteristics  [] unstable and unpredictable characteristics;   [x] Clinical decision making of [x] low, [] moderate, [] high complexity using standardized patient assessment instrument and/or measurable assessment of functional outcome.      [x] EVAL (LOW) 50862 (typically 20 minutes face-to-face)  [] EVAL (MOD) 08717 (typically 30 minutes face-to-face)  [] EVAL (HIGH) 45930 (typically 45 minutes face-to-face)  [] RE-EVAL         PLAN:  Frequency/Duration:  1 days per week for 1 Weeks: Single visit for HEP  INTERVENTIONS:  [x] Therapeutic exercise including: strength training, ROM, for Upper extremity and core   [x]  NMR activation and proprioception for UE, scap and Core   [x] Manual therapy as indicated for shoulder, scapula and spine to include: Dry Needling/IASTM, STM, PROM, Gr I-IV mobilizations, manipulation. [x] Modalities as needed that may include: thermal agents, E-stim, Biofeedback, US, iontophoresis as indicated  [x] Patient education on joint protection, postural re-education, activity modification, progression of HEP. GOALS:   Patient stated goal: Improve stability of shoulder. [] Progressing: [] Met: [] Not Met: [] Adjusted    Therapist goals for Patient:   Short Term Goals: To be achieved in: 2 weeks  1. Independent in HEP and progression per patient tolerance, in order to prevent re-injury. [] Progressing: [] Met: [] Not Met: [] Adjusted   2. Patient will have a decrease in pain to facilitate improvement in movement, function, and ADLs as indicated by Functional Deficits. [] Progressing: [] Met: [] Not Met: [] Adjusted    Electronically signed by:  Guillermo Hopper, PT, DPT    Note: If patient does not return for scheduled/ recommended follow up visits, this note will serve as a discharge from care along with most recent update on progress.

## 2021-01-26 NOTE — FLOWSHEET NOTE
[facility] [facility]    Physical Therapy Daily Treatment Note  Date:  2021    Patient Name:  Carroll Barriga    :  1950  MRN: [id number]  Restrictions/Precautions:    Medical/Treatment Diagnosis Information:  · Diagnosis: M25.512 (ICD-10-CM) - Left shoulder pain;M75.100 (ICD-10-CM) - Tear of rotator cuff;M75.82 (ICD-10-CM) - Tendonitis of left rotator cuff  · Treatment Diagnosis: M25.512 L Shoulder Pain; R53.1 Weakness  Insurance/Certification information:  PT Insurance Information: [place] - [place]; Medical [place] [employer]  Physician Information:  Referring Practitioner: Marquis Garrison MD  Has the plan of care been signed (Y/N):        []  Yes  [x]  No     Date of Patient follow up with Physician:  if needed      Is this a Progress Report:     []  Yes  [x]  No        If Yes:  Date Range for reporting period:  Beginnin21  Ending:     Progress report will be due (10 Rx or 30 days whichever is less): 36       Recertification will be due (POC Duration  / 90 days whichever is less): n/a         Visit # Insurance Allowable Auth Required   1 [place] []  Yes [x]  No        OUTCOME MEASURE DATE DEFICIT   UEFI 21 4% Deficit          Patient given satisfaction survey?  [] Yes   [] No      Latex Allergy:  [x]NO      []YES  Preferred Language for Healthcare:   [x]English       []other:    Pain level:  0/10     SUBJECTIVE:  See eval    OBJECTIVE:   ROM PROM AROM  Comment     L R L R     Flexion     130 140     Abduction     140 140     ER 60 90 T2 T2     IR 50 50 L2 L2     Other  (cervical)             Other                    Strength L R Comment   Flexion 4+ 5     Abduction 4+ 5     ER 4 4+     IR 5 5     Lower Trap 3+ 4     Mid Trap 4- 4     Biceps 5 5     Triceps 5 5        Special Tests Results/Comment   Chisholm-Christian Pos   Neers Neg   Speeds Neg   OBriens Neg   Apprehension  Neg   Load & Shift  Neg         Reflexes/Sensation:             [x]? Dermatomes/Myotomes intact               []? Reflexes equal and normal bilaterally               []? Other:     Joint mobility:               []? Normal               [x]? Hypo - inf glide              []? Hyper     Palpation: minimal TTP     Functional Mobility/Transfers: mod ind     Posture: fair - rounded shoulders, forward head     Bandages/Dressings/Incisions: n/a     Gait: (include devices/WB status): antalgic d/t recovering from quad tendon repair         RESTRICTIONS/PRECAUTIONS: none    Exercises/Interventions:   Exercises:  Exercise/Equipment Resistance/Repetitions Other comments   Stretching/PROM     Wand     Table Slides     UE [place] 10 x 10\" ER at 45 deg   Pulleys     Pendulum          Isometrics     Retraction          Weight shift     Flexion     Abduction     External Rotation     Internal Rotation     Biceps     Triceps          PRE's     Flexion     Abduction     External Rotation 3 x 10 S/L 0#   Internal Rotation     Shrugs     EXT     Reverse Flys     Serratus 3 x 10 Supine w/ cane   Horizontal Abd with ER     Biceps     Triceps     Retraction          Cable Column/Theraband     External Rotation     Internal Rotation     Shrugs     Lats     Ext     Flex     Scapular Retraction 3 x 10 Blue TB   BIC     TRIC     PNF          Dynamic Stability          Plyoback          Manual interventions                     Therapeutic Exercise and NMR EXR  [x] (32480) Provided verbal/tactile cueing for activities related to strengthening, flexibility, endurance, ROM  for improvements in scapular, scapulothoracic and UE control with self care, reaching, carrying, lifting, house/yardwork, driving/computer work.    [] (56628) Provided verbal/tactile cueing for activities related to improving balance, coordination, kinesthetic sense, posture, motor skill, proprioception  to assist with  scapular, scapulothoracic and UE control with self care, reaching, carrying, lifting, house/yardwork, driving/computer work. Therapeutic Activities:    [x] (47040 or 15812) Provided verbal/tactile cueing for activities related to improving balance, coordination, kinesthetic sense, posture, motor skill, proprioception and motor activation to allow for proper function of scapular, scapulothoracic and UE control with self care, carrying, lifting, driving/computer work.      Home Exercise Program:    [x] (68083) Reviewed/Progressed HEP activities related to strengthening, flexibility, endurance, ROM of scapular, scapulothoracic and UE control with self care, reaching, carrying, lifting, house/yardwork, driving/computer work  [] (42533) Reviewed/Progressed HEP activities related to improving balance, coordination, kinesthetic sense, posture, motor skill, proprioception of scapular, scapulothoracic and UE control with self care, reaching, carrying, lifting, house/yardwork, driving/computer work      Manual Treatments:  PROM / STM / Oscillations-Mobs:  G-I, II, III, IV (PA's, Inf., Post.)  [] (66438) Provided manual therapy to mobilize soft tissue/joints of cervical/CT, scapular GHJ and UE for the purpose of modulating pain, promoting relaxation,  increasing ROM, reducing/eliminating soft tissue swelling/inflammation/restriction, improving soft tissue extensibility and allowing for proper ROM for normal function with self care, reaching, carrying, lifting, house/yardwork, driving/computer work    Modalities:      Charges:  Timed Code Treatment Minutes: 25'   Total Treatment Minutes: 50'       [x] EVAL (LOW) 41860 (typically 20 minutes face-to-face)  [] EVAL (MOD) 26958 (typically 30 minutes face-to-face)  [] EVAL (HIGH) 85911 (typically 45 minutes face-to-face)  [] RE-EVAL     [x] BJ(30263) x 1   [] IONTO  [] NMR (09653) x     [] VASO  [] Manual (17405) x      [] Other:  [x] TA x 1     [] Mech Traction (53242)  [] ES(attended) (01999)      [] ES (un) (50065):     GOALS:  Patient stated goal: Improve stability of shoulder. []? Progressing: []? Met: []? Not Met: []? Adjusted     Therapist goals for Patient:   Short Term Goals: To be achieved in: 2 weeks  1. Independent in HEP and progression per patient tolerance, in order to prevent re-injury. []? Progressing: []? Met: []? Not Met: []? Adjusted   2. Patient will have a decrease in pain to facilitate improvement in movement, function, and ADLs as indicated by Functional Deficits. []? Progressing: []? Met: []? Not Met: []? Adjusted     Overall Progression Towards Functional goals/ Treatment Progress Update:  [] Patient is progressing as expected towards functional goals listed. [] Progression is slowed due to complexities/Impairments listed. [] Progression has been slowed due to co-morbidities. [x] Plan just implemented, too soon to assess goals progression <30days   [] Goals require adjustment due to lack of progress  [] Patient is not progressing as expected and requires additional follow up with physician  [] Other    Prognosis for POC: [x] Good [] Fair  [] Poor      Patient requires continued skilled intervention: [x] Yes  [] No    Treatment/Activity Tolerance:  [x] Patient able to complete treatment  [] Patient limited by fatigue  [] Patient limited by pain     [] Patient limited by other medical complications  [] Other:     HEP Instruction:               Access Code: [email or web address]  URL: [email or web address]  Date: 01/26/2021  Prepared by: Mark Wright    Exercises  Supine Shoulder External Rotation in 45 Degrees Abduction AAROM with Dowel - 10 reps - 1 sets - 10 sec hold - 1-3x daily - 7x weekly  Supine Shoulder Protraction with Dowel - 10 reps - 3 sets - 1x daily - 7x weekly  Sidelying Shoulder External Rotation - 10 reps - 3 sets - 1x daily - 7x weekly  Seated Shoulder Row with Anchored Resistance - 10 reps - 3 sets - 1x daily - 7x weekly      PLAN: See eval  [] Continue per plan of care [] Alter current plan (see comments above)  [x] Plan of care

## 2022-06-30 ENCOUNTER — ANESTHESIA EVENT (OUTPATIENT)
Dept: ENDOSCOPY | Age: 72
End: 2022-06-30
Payer: MEDICARE

## 2022-06-30 ENCOUNTER — HOSPITAL ENCOUNTER (OUTPATIENT)
Age: 72
Setting detail: OUTPATIENT SURGERY
Discharge: HOME | End: 2022-06-30
Attending: INTERNAL MEDICINE | Admitting: INTERNAL MEDICINE
Payer: MEDICARE

## 2022-06-30 ENCOUNTER — ANESTHESIA (OUTPATIENT)
Dept: ENDOSCOPY | Age: 72
End: 2022-06-30
Payer: MEDICARE

## 2022-06-30 VITALS
TEMPERATURE: 96.9 F | WEIGHT: 153.5 LBS | HEIGHT: 72 IN | OXYGEN SATURATION: 99 % | RESPIRATION RATE: 16 BRPM | HEART RATE: 66 BPM | BODY MASS INDEX: 20.79 KG/M2 | SYSTOLIC BLOOD PRESSURE: 116 MMHG | DIASTOLIC BLOOD PRESSURE: 67 MMHG

## 2022-06-30 DIAGNOSIS — R58 BLEEDING: ICD-10-CM

## 2022-06-30 PROCEDURE — 7100000010 HC PHASE II RECOVERY - FIRST 15 MIN: Performed by: INTERNAL MEDICINE

## 2022-06-30 PROCEDURE — 3700000000 HC ANESTHESIA ATTENDED CARE: Performed by: INTERNAL MEDICINE

## 2022-06-30 PROCEDURE — 88305 TISSUE EXAM BY PATHOLOGIST: CPT

## 2022-06-30 PROCEDURE — 2580000003 HC RX 258: Performed by: ANESTHESIOLOGY

## 2022-06-30 PROCEDURE — 7100000011 HC PHASE II RECOVERY - ADDTL 15 MIN: Performed by: INTERNAL MEDICINE

## 2022-06-30 PROCEDURE — 3609012400 HC EGD TRANSORAL BIOPSY SINGLE/MULTIPLE: Performed by: INTERNAL MEDICINE

## 2022-06-30 PROCEDURE — 6360000002 HC RX W HCPCS: Performed by: NURSE ANESTHETIST, CERTIFIED REGISTERED

## 2022-06-30 PROCEDURE — 2709999900 HC NON-CHARGEABLE SUPPLY: Performed by: INTERNAL MEDICINE

## 2022-06-30 PROCEDURE — 3700000001 HC ADD 15 MINUTES (ANESTHESIA): Performed by: INTERNAL MEDICINE

## 2022-06-30 RX ORDER — SUMATRIPTAN 100 MG/1
TABLET, FILM COATED ORAL
COMMUNITY
Start: 2022-06-03 | End: 2022-06-30

## 2022-06-30 RX ORDER — SODIUM CHLORIDE 9 MG/ML
INJECTION, SOLUTION INTRAVENOUS PRN
Status: DISCONTINUED | OUTPATIENT
Start: 2022-06-30 | End: 2022-06-30 | Stop reason: HOSPADM

## 2022-06-30 RX ORDER — PROPOFOL 10 MG/ML
INJECTION, EMULSION INTRAVENOUS CONTINUOUS PRN
Status: DISCONTINUED | OUTPATIENT
Start: 2022-06-30 | End: 2022-06-30 | Stop reason: SDUPTHER

## 2022-06-30 RX ORDER — GABAPENTIN 300 MG/1
300 CAPSULE ORAL 3 TIMES DAILY
COMMUNITY
Start: 2022-06-03

## 2022-06-30 RX ORDER — ALFUZOSIN HYDROCHLORIDE 10 MG/1
10 TABLET, EXTENDED RELEASE ORAL
COMMUNITY

## 2022-06-30 RX ORDER — SILDENAFIL 100 MG/1
100 TABLET, FILM COATED ORAL PRN
COMMUNITY

## 2022-06-30 RX ORDER — SULFAMETHOXAZOLE AND TRIMETHOPRIM 800; 160 MG/1; MG/1
TABLET ORAL
COMMUNITY
Start: 2022-04-18 | End: 2022-06-30

## 2022-06-30 RX ORDER — SODIUM CHLORIDE 9 MG/ML
INJECTION, SOLUTION INTRAVENOUS CONTINUOUS
Status: DISCONTINUED | OUTPATIENT
Start: 2022-06-30 | End: 2022-06-30 | Stop reason: HOSPADM

## 2022-06-30 RX ORDER — SODIUM CHLORIDE 0.9 % (FLUSH) 0.9 %
5-40 SYRINGE (ML) INJECTION PRN
Status: DISCONTINUED | OUTPATIENT
Start: 2022-06-30 | End: 2022-06-30 | Stop reason: HOSPADM

## 2022-06-30 RX ORDER — CEPHALEXIN 500 MG/1
500 CAPSULE ORAL 4 TIMES DAILY
COMMUNITY
Start: 2021-09-21 | End: 2022-06-30

## 2022-06-30 RX ORDER — GABAPENTIN 300 MG/1
CAPSULE ORAL
COMMUNITY
Start: 2022-06-03 | End: 2022-06-30

## 2022-06-30 RX ORDER — SODIUM CHLORIDE 0.9 % (FLUSH) 0.9 %
5-40 SYRINGE (ML) INJECTION EVERY 12 HOURS SCHEDULED
Status: DISCONTINUED | OUTPATIENT
Start: 2022-06-30 | End: 2022-06-30 | Stop reason: HOSPADM

## 2022-06-30 RX ORDER — SUMATRIPTAN 100 MG/1
100 TABLET, FILM COATED ORAL PRN
COMMUNITY
Start: 2022-06-03 | End: 2022-06-30

## 2022-06-30 RX ORDER — ALBUTEROL SULFATE 90 UG/1
2 AEROSOL, METERED RESPIRATORY (INHALATION) EVERY 6 HOURS PRN
COMMUNITY

## 2022-06-30 RX ORDER — PROPOFOL 10 MG/ML
INJECTION, EMULSION INTRAVENOUS PRN
Status: DISCONTINUED | OUTPATIENT
Start: 2022-06-30 | End: 2022-06-30 | Stop reason: SDUPTHER

## 2022-06-30 RX ADMIN — PROPOFOL 100 MG: 10 INJECTION, EMULSION INTRAVENOUS at 11:44

## 2022-06-30 RX ADMIN — PROPOFOL 100 MG: 10 INJECTION, EMULSION INTRAVENOUS at 11:51

## 2022-06-30 RX ADMIN — PROPOFOL 140 MCG/KG/MIN: 10 INJECTION, EMULSION INTRAVENOUS at 11:44

## 2022-06-30 RX ADMIN — SODIUM CHLORIDE: 9 INJECTION, SOLUTION INTRAVENOUS at 10:56

## 2022-06-30 ASSESSMENT — PAIN - FUNCTIONAL ASSESSMENT: PAIN_FUNCTIONAL_ASSESSMENT: NONE - DENIES PAIN

## 2022-06-30 ASSESSMENT — ENCOUNTER SYMPTOMS: SHORTNESS OF BREATH: 0

## 2022-06-30 ASSESSMENT — PAIN SCALES - GENERAL
PAINLEVEL_OUTOF10: 0
PAINLEVEL_OUTOF10: 0

## 2022-06-30 NOTE — OP NOTE
EGD PROCEDURE NOTE         Esophagogastroduodenoscopy Procedure Note     Patient: Justa Scott MRN: [id number]   YOB: 1950 Age: 81 y.o. Sex: male       Admitting Physician: Ravi Goodman     Primary Care Physician: Jonna Morris MD      DATE OF PROCEDURE: 6/30/2022  PROCEDURE: Esophagogastroduodenoscopy with push enteroscopy  INDICATION: This is a 81y.o. year old male who presents with iron deficiency anemia and occult blood in the stool. ENDOSCOPIST: Elen Reynolds MD    POSTOPERATIVE DIAGNOSIS:    1.  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 Normal-appearing duodenum, biopsied  3. Otherwise negative push enteroscopy to the proximal mid jejunum    PLAN:    1. Follow-up biopsies; the patient to contact me in 1 week for results  2. Anticipate capsule endoscopy but will wait on results    INFORMED CONSENT:  Informed consent for esophagogastoduodenoscopy was obtained. The benefits and risks including adverse medicine reaction have been explained. The patient's questions were answered and the patient agreed to proceed. ASA:  ASA 2 - Patient with mild systemic disease with no functional limitations    SEDATION: MAC     The patient's vital signs, cardiac status, pulmonary status, abdominal status and mental status were stable for the procedure. The patient's vitals signs and respiratory function as monitored by oxygen saturation were stable throughout    Procedure Details: The Olympus videoendoscope was inserted into the mouth and carefully passed into the esophagus, through the stomach and to the distal duodenum. Antegrade and retrograde examination of the upper gi tract was carefully performed. Following standard upper endoscopy, I passed a pediatric colonoscope to the proximal and mid jejunum. Retrograde examination was then again performed. Findings:    The esophagus is normal.  The z-line is distinct without lesions or irregularities. There is no evidence of Rogers's esophagus. The scope easily passed into the stomach. There is mild streaky and patchy erythema in the antrum. 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 Random gastric biopsies were done to rule out a significant gastritis and Helicobacter pylori infection. Otherwise the more proximal gastric mucosa is normal.  The pylorus is patent and of normal contour. The scope easily advanced into the duodenal bulb and down to the distal duodenum. Ante-and retrograde exam of the duodenum is normal.  Biopsies were taken from the distal duodenum and duodenal bulb to rule out celiac disease. I then passed the pediatric colonoscope and advances to the proximal mid jejunum. Retrograde examination of the jejunum and distal duodenum as well as duodenal stomach and esophagus was unremarkable.     Gastric or Duodenal ulcer present: No    Estimated Blood Loss: Minimal      Signed By: Krista Linares MD

## 2022-06-30 NOTE — ANESTHESIA PRE PROCEDURE
Department of Anesthesiology  Preprocedure Note       Name:  Babita Kidney   Age:  81 y.o.  :  1950                                          MRN:  [id number]         Date:  2022      Surgeon: Gabriella Roth):  Olivia Zuniga MD    Procedure: Procedure(s):  EGD ESOPHAGOGASTRODUODENOSCOPY    Medications prior to admission:   Prior to Admission medications    Medication Sig Start Date End Date Taking? Authorizing Provider   gabapentin (NEURONTIN) 300 MG capsule Take 300 mg by mouth 3 times daily. 6/3/22  Yes Historical Provider, MD   sildenafil (VIAGRA) 100 MG tablet Take 100 mg by mouth as needed for Erectile Dysfunction   Yes Historical Provider, MD   albuterol sulfate HFA (PROVENTIL;VENTOLIN;PROAIR) 108 (90 Base) MCG/ACT inhaler Inhale 2 puffs into the lungs every 6 hours as needed    Historical Provider, MD   alfuzosin (UROXATRAL) 10 MG extended release tablet Take 10 mg by mouth daily (with breakfast)    Historical Provider, MD   traZODone (DESYREL) 100 MG tablet TAKE 1 TABLET BY MOUTH EVERYDAY AT BEDTIME 19   Historical Provider, MD   rosuvastatin (CRESTOR) 40 MG tablet Take 40 mg by mouth    Historical Provider, MD   lisinopril (PRINIVIL;ZESTRIL) 10 MG tablet Take 10 mg by mouth    Historical Provider, MD   glucosamine-chondroitin 500-400 MG CAPS Take by mouth    Historical Provider, MD   ferrous sulfate 325 (65 Fe) MG tablet Take 325 mg by mouth    Historical Provider, MD   minoxidil (LONITEN) 2.5 MG tablet Take 2.5 mg by mouth daily    Historical Provider, MD   pantoprazole (PROTONIX) 40 MG tablet Take 40 mg by mouth daily. Historical Provider, MD   dicyclomine (BENTYL) 20 MG tablet Take 20 mg by mouth every 6 hours. Historical Provider, MD   aspirin 81 MG tablet Take 81 mg by mouth daily.       Historical Provider, MD       Current medications:    Current Facility-Administered Medications   Medication Dose Route Frequency Provider Last Rate Last Admin    0.9 % sodium chloride infusion IntraVENous Continuous Eva Castro MD 75 mL/hr at 06/30/22 1056 New Bag at 06/30/22 1056    sodium chloride flush 0.9 % injection 5-40 mL  5-40 mL IntraVENous 2 times per day Eva Castro MD        sodium chloride flush 0.9 % injection 5-40 mL  5-40 mL IntraVENous PRN Eva Castro MD        0.9 % sodium chloride infusion   IntraVENous PRN Eva Castro MD           Allergies:  No Known Allergies    Problem List:    Patient Active Problem List   Diagnosis Code    Myofascial pain syndrome M79.18    Arthritis, shoulder region M19.019    Hamstring tear S76.319A    Achilles tendinitis of left lower extremity M76.62    AR (allergic rhinitis) J30.9    Calculus of kidney C15.5    Diastolic dysfunction F08.22    Dyslipidemia E78.5    Dyspepsia R10.13    Dyspnea R06.00    Gastroesophageal reflux disease K21.9    HTN (hypertension) I10    Increased frequency of urination R35.0    Iron deficiency anemia D50.9    Insomnia G47.00    Irritable bowel syndrome K58.9    Nocturia R35.1    Personal history of kidney stones Z87.442    Primary osteoarthritis of hip M16.10    Primary osteoarthritis of right knee M17.11    Retention of urine R33.9    Acute superficial gastritis without hemorrhage K29.00    Spondylosis with myelopathy, lumbar region M47.16    Spondylolisthesis of lumbar region M43.16    Upper respiratory infection J06.9       Past Medical History:        Diagnosis Date    Arthritis, shoulder region 10/17/2012    GERD (gastroesophageal reflux disease)     Hyperlipidemia     Hypertension     Irritable bowel syndrome 4/24/2009    Kidney stones     Primary osteoarthritis of hip 8/24/2017       Past Surgical History:        Procedure Laterality Date    ACHILLES TENDON SURGERY      APPENDECTOMY      ELBOW SURGERY      SHOULDER ARTHROSCOPY Right     SHOULDER SURGERY      TONSILLECTOMY AND ADENOIDECTOMY         Social History:    Social History     Tobacco Use    Smoking status: Former Smoker     Years: 13.00     Quit date:      Years since quittin.5    Smokeless tobacco: Never Used   Substance Use Topics    Alcohol use: No                                Counseling given: Not Answered      Vital Signs (Current):   Vitals:    22 0802 22 1051   BP:  134/72   Pulse:  59   Resp:  16   Temp:  (!) 96.2 °F (35.7 °C)   TempSrc:  Temporal   SpO2:  100%   Weight: 152 lb (68.9 kg) 153 lb 8 oz (69.6 kg)   Height: 6' (1.829 m) 6' (1.829 m)                                              BP Readings from Last 3 Encounters:   22 134/72   19 126/67   19 126/67       NPO Status: Time of last liquid consumption:                         Time of last solid consumption:                         Date of last liquid consumption: 22                        Date of last solid food consumption: 22    BMI:   Wt Readings from Last 3 Encounters:   22 153 lb 8 oz (69.6 kg)   21 150 lb (68 kg)   19 155 lb 6.8 oz (70.5 kg)     Body mass index is 20.82 kg/m². CBC: No results found for: WBC, RBC, HGB, HCT, MCV, RDW, PLT    CMP:   Lab Results   Component Value Date/Time     2012 01:43 PM    K 4.6 2012 01:43 PM     2012 01:43 PM    CO2 28 2012 01:43 PM    BUN 18 2012 01:43 PM    CREATININE 0.8 2012 11:43 AM    CREATININE 0.93 2012 01:43 PM    GFRAA 100 2011 03:11 PM    AGRATIO 2.0 2012 01:43 PM    LABGLOM 90 2012 01:43 PM    GLUCOSE 96 2012 01:43 PM    PROT 6.8 2012 01:43 PM    CALCIUM 9.6 2012 01:43 PM    BILITOT 0.4 2012 01:43 PM    ALKPHOS 48 2012 01:43 PM    AST 28 2012 01:43 PM    ALT 31 2012 01:43 PM       POC Tests: No results for input(s): POCGLU, POCNA, POCK, POCCL, POCBUN, POCHEMO, POCHCT in the last 72 hours.     Coags: No results found for: PROTIME, INR, APTT    HCG (If Applicable): No results found for: PREGTESTUR, PREGSERUM, HCG, HCGQUANT     ABGs: No results found for: PHART, PO2ART, YQS5APA, EGS1XGT, BEART, G9DXGRBK     Type & Screen (If Applicable):  No results found for: LABABO, LABRH    Drug/Infectious Status (If Applicable):  No results found for: HIV, HEPCAB    COVID-19 Screening (If Applicable): No results found for: COVID19        Anesthesia Evaluation  Patient summary reviewed and Nursing notes reviewed no history of anesthetic complications:   Airway: Mallampati: II  TM distance: >3 FB   Neck ROM: full  Mouth opening: > = 3 FB   Dental: normal exam         Pulmonary: breath sounds clear to auscultation  (+) asthma (uses albuterol prn): allergic asthma,     (-) pneumonia, shortness of breath, recent URI and sleep apnea                           Cardiovascular:  Exercise tolerance: good (>4 METS),   (+) hypertension:, hyperlipidemia    (-) valvular problems/murmurs, past MI, CAD, CABG/stent, dysrhythmias,  angina,  CHF,  FERRO and no pulmonary hypertension      Rhythm: regular  Rate: normal                 ROS comment: Exercise Stress 2017:  Normal         Neuro/Psych:   (+) neuromuscular disease (myofascial pain syndrome):,    (-) seizures, TIA and CVA           GI/Hepatic/Renal:   (+) GERD:,      (-) liver disease and no renal disease       Endo/Other:        (-) diabetes mellitus, hypothyroidism               Abdominal:             Vascular:     - PVD, DVT and PE. Other Findings:           Anesthesia Plan      MAC     ASA 3     (Discussed risks and benefits to sedation including nausea, vomiting, allergic reaction, headache, delayed cognitive recovery, stroke, heart attack, respiratory depression, and death which patient understood and agreed to proceed. The patient was given the opportunity to ask questions and all questions were answered to the patient's satisfaction.  )  Induction: intravenous. Anesthetic plan and risks discussed with patient.       Plan discussed with CRNA.                This pre-anesthesia assessment may be used as a history and physical.    DOS STAFF ADDENDUM:    Pt seen and examined, chart reviewed (including anesthesia, drug and allergy history). No interval changes to history and physical examination. Anesthetic plan, risks, benefits, alternatives, and personnel involved discussed with patient. Patient verbalized an understanding and agrees to proceed.       Ra Hansen MD  June 30, 2022  11:12 AM

## 2022-06-30 NOTE — H&P
Gastroenterology Outpatient History and Physical     Patient: Jose Cisneros MRN: [id number] Sex: male   YOB: 1950 Age: 81 y.o. Location: [address] [place] [address] 12    Date:2022  Primary Care Physician: Kit Dowd MD         Patient: Jose Cisneros    Physician: Vamshi Ovalles MD    History of Present Illness: Fe deficiency anemia  Review of Systems:  Weight Loss: No  Dysphagia: No  Dyspepsia: No  History:  Past Medical History:   Diagnosis Date    Arthritis, shoulder region 10/17/2012    GERD (gastroesophageal reflux disease)     Hyperlipidemia     Hypertension     Irritable bowel syndrome 2009    Kidney stones     Primary osteoarthritis of hip 2017      Past Surgical History:   Procedure Laterality Date    ACHILLES TENDON SURGERY      APPENDECTOMY      ELBOW SURGERY      SHOULDER ARTHROSCOPY Right     SHOULDER SURGERY      TONSILLECTOMY AND ADENOIDECTOMY        Social History     Socioeconomic History    Marital status:      Spouse name: None    Number of children: None    Years of education: None    Highest education level: None   Occupational History    None   Tobacco Use    Smoking status: Former Smoker     Years: 13.00     Quit date:      Years since quittin.5    Smokeless tobacco: Never Used   Substance and Sexual Activity    Alcohol use: No    Drug use: No    Sexual activity: None   Other Topics Concern    None   Social History Narrative    None     Social Determinants of Health     Financial Resource Strain:     Difficulty of Paying Living Expenses: Not on file   Food Insecurity:     Worried About Running Out of Food in the Last Year: Not on file    Demetrio of Food in the Last Year: Not on file   Transportation Needs:     Lack of Transportation (Medical): Not on file    Lack of Transportation (Non-Medical):  Not on file   Physical Activity:     Days of Exercise per Week: Not on file    Minutes of Exercise per Session: Not on file   Stress:     Feeling of Stress : Not on file   Social Connections:     Frequency of Communication with Friends and Family: Not on file    Frequency of Social Gatherings with Friends and Family: Not on file    Attends Hindu Services: Not on file    Active Member of Clubs or Organizations: Not on file    Attends Club or Organization Meetings: Not on file    Marital Status: Not on file   Intimate Partner Violence:     Fear of Current or Ex-Partner: Not on file    Emotionally Abused: Not on file    Physically Abused: Not on file    Sexually Abused: Not on file   Housing Stability:     Unable to Pay for Housing in the Last Year: Not on file    Number of [place] in the Last Year: Not on file    Unstable Housing in the Last Year: Not on file      Family History   Problem Relation Age of Onset    Heart Disease Father     Alcohol Abuse Father     Coronary Art Dis Father     Depression Father     Hypertension Father     High Cholesterol Father     Mental Illness Sister      Allergies: No Known Allergies  Medications:   Prior to Admission medications    Medication Sig Start Date End Date Taking? Authorizing Provider   gabapentin (NEURONTIN) 300 MG capsule Take 300 mg by mouth 3 times daily.  6/3/22  Yes Historical Provider, MD   sildenafil (VIAGRA) 100 MG tablet Take 100 mg by mouth as needed for Erectile Dysfunction   Yes Historical Provider, MD   albuterol sulfate HFA (PROVENTIL;VENTOLIN;PROAIR) 108 (90 Base) MCG/ACT inhaler Inhale 2 puffs into the lungs every 6 hours as needed    Historical Provider, MD   alfuzosin (UROXATRAL) 10 MG extended release tablet Take 10 mg by mouth daily (with breakfast)    Historical Provider, MD   traZODone (DESYREL) 100 MG tablet TAKE 1 TABLET BY MOUTH EVERYDAY AT BEDTIME 7/9/19   Historical Provider, MD   rosuvastatin (CRESTOR) 40 MG tablet Take 40 mg by mouth    Historical Provider, MD   lisinopril (PRINIVIL;ZESTRIL) 10 MG tablet Take 10 mg by mouth Historical Provider, MD   glucosamine-chondroitin 500-400 MG CAPS Take by mouth    Historical Provider, MD   ferrous sulfate 325 (65 Fe) MG tablet Take 325 mg by mouth    Historical Provider, MD   minoxidil (LONITEN) 2.5 MG tablet Take 2.5 mg by mouth daily    Historical Provider, MD   pantoprazole (PROTONIX) 40 MG tablet Take 40 mg by mouth daily. Historical Provider, MD   dicyclomine (BENTYL) 20 MG tablet Take 20 mg by mouth every 6 hours. Historical Provider, MD   aspirin 81 MG tablet Take 81 mg by mouth daily. Historical Provider, MD       Vital Signs: /72   Pulse 59   Temp (!) 96.2 °F (35.7 °C) (Temporal)   Resp 16   Ht 6' (1.829 m)   Wt 153 lb 8 oz (69.6 kg)   SpO2 100%   BMI 20.82 kg/m²   Physical Exam:   Heart: regular  Lungs: non-labored breathing  Mental status:  Alert and oriented    ASA score:  ASA 2 - Patient with mild systemic disease with no functional limitations{  Mallimpati score:  2     Planned Procedure: egd with push enteroscopy    Planned Sedation:  Monitored anesthesia.     Signed By: Sandeep Ashley MD   June 30, 2022

## 2022-06-30 NOTE — PROGRESS NOTES
[facility] ENDOSCOPY EGD PRE-OPERATIVE INSTRUCTIONS    Procedure date_06/30/2022________  Arrival time___1030_________          Rafal Sow time___1130_________       Nothing by mouth after midnight the night before the procedure. This includes water chewing gum, mints and ice chips. You may brush your teeth and gargle the morning of your surgery, but do not swallow the water    You may be asked to stop blood thinners such as Coumadin, Plavix, Fragmin, Lovenox, etc., or any anti-inflammatories such as:  Aspirin, Ibuprofen, Advil, Naproxen prior to your procedure. We also ask that you stop any OTC medications such as fish oil, vitamin E, glucosamine, garlic, Multivitamins, COQ 10, etc.    You must make arrangements for a responsible adult to arrive with you and stay in our waiting area during your procedure. They will also need to take you home after your procedure. For your safety you will not be allowed to leave alone or drive yourself home. Also for your safety, it is strongly suggested that someone stay with you the first 24 hours after your procedure. For your comfort, please wear simple loose fitting clothing to the center. Please do not bring valuables. If you have a living will and a durable power of  for healthcare, please bring in a copy.     You will need to bring a photo ID and insurance card    Our goal is to provide you with excellent care so if you have any questions, please contact us at the [facility] at [phone]         Please note these are generalized instructions for all EGD cases, you may be provided with more specific instructions if necessary

## 2022-06-30 NOTE — ANESTHESIA POSTPROCEDURE EVALUATION
Department of Anesthesiology  Postprocedure Note    Patient: Mini Coleman  MRN: [id number]  YOB: 1950  Date of evaluation: 6/30/2022      Procedure Summary     Date: 06/30/22 Room / Location: [address] [place] [address]    Anesthesia Start: 8818 Anesthesia Stop: 1329    Procedure: EGD BIOPSY (N/A ) Diagnosis:       Bleeding      (Bleeding)    Surgeons: Jeniffer Ortiz MD Responsible Provider: Dayron Bello MD    Anesthesia Type: MAC ASA Status: 3          Anesthesia Type: No value filed.     Luke Phase I: Luke Score: 10    Luke Phase II: Luke Score: 10      Anesthesia Post Evaluation    Patient location during evaluation: PACU  Patient participation: complete - patient participated  Level of consciousness: awake  Airway patency: patent  Nausea & Vomiting: no nausea and no vomiting  Cardiovascular status: blood pressure returned to baseline  Respiratory status: acceptable  Hydration status: stable  Multimodal analgesia pain management approach

## 2022-07-02 NOTE — RESULT ENCOUNTER NOTE
EGD done July 30 for iron deficiency anemia and heme positive stool showed aspirin gastropathy. I reported the results and recommended capsule endoscopy.

## 2022-10-13 ENCOUNTER — OFFICE VISIT (OUTPATIENT)
Dept: ORTHOPEDIC SURGERY | Age: 72
End: 2022-10-13
Payer: MEDICARE

## 2022-10-13 VITALS — WEIGHT: 153 LBS | HEIGHT: 72 IN | BODY MASS INDEX: 20.72 KG/M2

## 2022-10-13 DIAGNOSIS — M25.511 RIGHT SHOULDER PAIN, UNSPECIFIED CHRONICITY: Primary | ICD-10-CM

## 2022-10-13 DIAGNOSIS — M75.81 ROTATOR CUFF TENDONITIS, RIGHT: ICD-10-CM

## 2022-10-13 PROCEDURE — 99213 OFFICE O/P EST LOW 20 MIN: CPT | Performed by: ORTHOPAEDIC SURGERY

## 2022-10-13 PROCEDURE — 1123F ACP DISCUSS/DSCN MKR DOCD: CPT | Performed by: ORTHOPAEDIC SURGERY

## 2022-10-13 PROCEDURE — 1036F TOBACCO NON-USER: CPT | Performed by: ORTHOPAEDIC SURGERY

## 2022-10-13 PROCEDURE — G8427 DOCREV CUR MEDS BY ELIG CLIN: HCPCS | Performed by: ORTHOPAEDIC SURGERY

## 2022-10-13 PROCEDURE — G8420 CALC BMI NORM PARAMETERS: HCPCS | Performed by: ORTHOPAEDIC SURGERY

## 2022-10-13 PROCEDURE — 20610 DRAIN/INJ JOINT/BURSA W/O US: CPT | Performed by: ORTHOPAEDIC SURGERY

## 2022-10-13 PROCEDURE — G8484 FLU IMMUNIZE NO ADMIN: HCPCS | Performed by: ORTHOPAEDIC SURGERY

## 2022-10-13 PROCEDURE — 3017F COLORECTAL CA SCREEN DOC REV: CPT | Performed by: ORTHOPAEDIC SURGERY

## 2022-10-13 RX ORDER — TADALAFIL 5 MG/1
TABLET ORAL
COMMUNITY
Start: 2022-09-28

## 2022-10-13 RX ORDER — METHYLPREDNISOLONE ACETATE 40 MG/ML
80 INJECTION, SUSPENSION INTRA-ARTICULAR; INTRALESIONAL; INTRAMUSCULAR; SOFT TISSUE ONCE
Status: COMPLETED | OUTPATIENT
Start: 2022-10-13 | End: 2022-10-13

## 2022-10-13 RX ORDER — LIDOCAINE HYDROCHLORIDE 10 MG/ML
8 INJECTION, SOLUTION INFILTRATION; PERINEURAL ONCE
Status: COMPLETED | OUTPATIENT
Start: 2022-10-13 | End: 2022-10-13

## 2022-10-13 RX ORDER — MELOXICAM 15 MG/1
15 TABLET ORAL DAILY PRN
Qty: 30 TABLET | Refills: 5 | Status: SHIPPED | OUTPATIENT
Start: 2022-10-13

## 2022-10-13 RX ADMIN — LIDOCAINE HYDROCHLORIDE 8 ML: 10 INJECTION, SOLUTION INFILTRATION; PERINEURAL at 16:52

## 2022-10-13 RX ADMIN — METHYLPREDNISOLONE ACETATE 80 MG: 40 INJECTION, SUSPENSION INTRA-ARTICULAR; INTRALESIONAL; INTRAMUSCULAR; SOFT TISSUE at 16:51

## 2022-10-13 NOTE — PROGRESS NOTES
infection. Palpation: Tenderness over the rotator cuff, non-tender biceps/ac joint    Active Range of Motion: Forward Elevation 150, External Rotation 50, Internal Rotation T10    Passive Range of Motion: Forward Elevation 150    Strength:  External Rotation 4+/5, Internal Rotation 4+/5, Champagne Toast 4+/5    Special Tests:  Positive Neer's, Negative Venkat's, Mildly positive Hawkin's, No Deandre muscle deformity. Neurovascular: Sensation to light touch is intact, no motor deficits, palpable radial pulses 2+      Radiology:     Plain radiographs of the right shoulder comprising 3 views: were obtained and reviewed in the office: Normal radiographic exam, no signs of fractures, dislocations or subluxations. 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 patient with right shoulder rotator cuff tendonitis. A trial of conservative treatment is most appropriate. Impression:  Encounter Diagnoses   Name Primary? Right shoulder pain, unspecified chronicity Yes    Rotator cuff tendonitis, right        Office Procedures:  Orders Placed This Encounter   Procedures    XR SHOULDER RIGHT (MIN 2 VIEWS)     Standing Status:   Future     Number of Occurrences:   1     Standing Expiration Date:   10/13/2023     Order Specific Question:   Reason for exam:     Answer:   right shoulder pain    20610 - LA DRAIN/INJECT LARGE JOINT/BURSA     After discussing the risks and benefits of a corticosteroid injection, Duglas Elam did state an understanding and gave verbal consent to proceed. After cleansing the injection site with Chlora-prep and using aseptic techniques,  2 CC of Depo Medrol 40mg/ml and 8 CC of 1% lidocaine were injected in the right subacromial and glenohumeral joint. He  tolerated the procedure well with no immediate adverse sequelae after the injection. A bandage was placed over the injection site. Appropriate post injections instructions were given to the patient.     Treatment Plan:  We do feel he will benefit from an intra-articular corticosteroid injection. He may ease back into his workout regimen as tolerated. We would also like for him to make time rotator cuff exercises and shoulder stretches. We will call meloxicam in to his pharmacy. In regards to his rib pain, we advise he follow up with his family doctor regarding this. We will see Gregg Becker back in 6 weeks when he travels back to [place], however if he is doing well he may call to cancel that appointment and/or as needed. All questions were answered to patient's satisfaction and He was encouraged to call with any further questions or concerns. Godfrey Vieira is in agreement with this plan. 10/13/2022  3:48 PM    Meghna Duran ATC  [facility] [address] [facility]    During this examination, I, Néstor Wills, functioned as a scribe for Dr. Monster Kimbrough. The history taking and physical examination were performed by Dr. Rhonda Leslie. All counseling during the appointment was performed between the patient and Dr. Rhonda Leslie. 10/13/22  ______________  I, Dr. Monster Kimbrough, personally performed the services described in this documentation as described by Meghna Duran ATC in my presence, and it is both accurate and complete. Leo Leslie MD, PhD  10/13/2022

## 2022-12-07 ENCOUNTER — OFFICE VISIT (OUTPATIENT)
Dept: ORTHOPEDIC SURGERY | Age: 72
End: 2022-12-07

## 2022-12-07 VITALS — HEIGHT: 72 IN | WEIGHT: 153 LBS | BODY MASS INDEX: 20.72 KG/M2

## 2022-12-07 DIAGNOSIS — R07.81 RIB PAIN ON RIGHT SIDE: ICD-10-CM

## 2022-12-07 DIAGNOSIS — M25.511 RIGHT SHOULDER PAIN, UNSPECIFIED CHRONICITY: Primary | ICD-10-CM

## 2022-12-07 NOTE — LETTER
Physical Therapy Rehabilitation Referral    Patient Name:  Marek Car      YOB: 1950    Diagnosis:    1. Right shoulder pain, unspecified chronicity        Precautions:     [x] Evaluate and Treat    Post Op Instructions:  [] Continuous passive motion (CPM) [] Elbow ROM  [x] Exercise in plane of scapula  []  Strengthening     [x] Pulley and instruction   [x] Home exercise program (copy to patient)   [] Sling when arm at risk  [] Sling or brace at all times   [] AAROM: Forward elevation to  140            [] AAROM: External rotation  To  40    [x] Isometric external rotator strengthening [] AAROM: internal rotation: up the back  [x] Isometric abductor strengthening  [] AAROM: Internal abduction   [] Isometric internal rotator strengthening [] AAROM: cross-body adduction             Stretching:     Strengthening:  [x] Four quadrant (FE, ER, IR, CBA)  [x] Rotator cuff (ER, IR, Abd)  [x] Forward Elevation    [] External Rotators     [x] External Rotation    [] Internal Rotators  [x] Internal Rotation: up/back   [] Abductors     [x] Internal Rotation: supine in abduction  [x] Sleeper Stretch    [] Flexors  [x] Cross-body abduction    [] Extensors  [x] Pendulum (FE, Abd/Add, cw/ccw)  [x] Scapular Stabilizers   [x] Wall-walking (FE, Abd)        [x] Shoulder shrugs     [x] Table slides (FE)                [x] Rhomboid pinch  [] Elbow (flex, ext, pron, sup)        [] Lat.  Pull downs     [] Medial epicondylitis program       [] Forward punch   [] Lateral epicondylitis program       [] Internal rotators     [] Progressive resistive exercises  [] Bench Press        [] Bench press plus  Activities:     [] Lateral pull-downs  [] Rowing     [] Progressive two-hand supine press  [] Stepper/Exercise bike   [x] Biceps: curls/supination  [] Swimming  [] Water exercises    Modalities:     Return to Sport:  [x] Of Choice      [] Plyometrics  [] Ultrasound     [] Rhythmic stabilization  [] Iontophoresis    [] Core strengthening   [] Moist heat     [] Sports specific program:   [] Massage         [x] Cryotherapy      [] Electrical stimulation     [] Paraffin  [] Whirlpool  [] TENS    [x] Home exercise program (copy to patient). Perform exercises for:   15     minutes    3      times/day  [x] Supervised physical therapy  Frequency: []  1x week  [x] 2x week  [] 3x week  [] Other:   Duration: [] 2 weeks   [] 4 weeks  [x] 6 weeks  [] Other:     Additional Instructions:     Leo Rouse MD, PhD

## 2022-12-07 NOTE — PROGRESS NOTES
Chief Complaint    Shoulder Pain (F/U RIGHT SHOULDER)      History of Present Illness:  Mayela Chavez is a pleasant, 81 y.o., male, here today for follow up of right shoulder pain. At last visit he underwent a cortisone injection and returned to [place]. He states that he had significant relief of symptoms with cortisone injection to the point that he was able to return to playing pickle ball exercising in the gym. Fortunately this seems to aggravate his symptoms again. Notably, he has decreased his activities and did notice some improvement in symptoms. As a secondary issue he does continue to have right posterior rib pain related to a remote rib fracture. He states that he had a CT scan done recently with reformats that address the bone. He endorses dermatomal distribution pain throughout the eighth and ninth rib region          Pain Assessment  Location of Pain: Shoulder  Location Modifiers: Right  Severity of Pain: 1  Quality of Pain: Aching  Frequency of Pain: Constant  Aggravating Factors: Other (Comment), Straightening, Exercise, Stretching  Limiting Behavior: Yes  Relieving Factors: Rest  Work-Related Injury: No  Are there other pain locations you wish to document?: No      Medical History:  Patient's medications, allergies, past medical, surgical, social and family histories were reviewed and updated as appropriate. No notes on file    Review of Systems  A 14 point review of systems was completed by the patient and is available in the media section of the scanned medical record and was reviewed on 12/7/2022. The review is negative with the exception of those things mentioned in the HPI and Past Medical History    Vital Signs: There were no vitals filed for this visit. General/Appearance: Alert and oriented and in no apparent distress. Skin:  There are no skin lesions, cellulitis, or extreme edema.  The patient has warm and well-perfused Bilateral upper extremities with brisk capillary refill. Right Shoulder Exam:  Inspection: No gross deformities, no signs of infection. Palpation: Tenderness over the rotator cuff, non-tender biceps/ac joint     Active Range of Motion: Forward Elevation 150, External Rotation 50, Internal Rotation T10     Passive Range of Motion: Forward Elevation 150     Strength:  External Rotation 4+/5, Internal Rotation 4+/5, Champagne Toast 4+/5     Special Tests:  Positive Neer's, Negative Venkat's, Mildly positive Hawkin's, No Deandre muscle deformity. Neurovascular: Sensation to light touch is intact, no motor deficits, palpable radial pulses 2+    Pain to palpation along the posterolateral aspect of the eighth and ninth ribs    Radiology:     No new XR obtained at this time. Assessment :  Mr. Bhavesh Mckay is a pleasant, 81 y.o. patient who is following up today with respect to right shoulder pain suspected to be related to rotator cuff tendinitis versus a small partial-thickness rotator cuff tear. Impression:  Encounter Diagnoses   Name Primary?     Right shoulder pain, unspecified chronicity Yes    Rib pain on right side        Office Procedures:  Orders Placed This Encounter   Procedures    MRI SHOULDER RIGHT WO CONTRAST     Standing Status:   Future     Standing Expiration Date:   12/7/2023     Order Specific Question:   Reason for exam:     Answer:   EVAL re-tear    MRI CHEST WO CONTRAST     Standing Status:   Future     Standing Expiration Date:   12/7/2023     Order Specific Question:   Reason for exam:     Answer:   hx of rib fx    [facility] [place] (Or[facility]o [facility])- [facility]     Referral Priority:   Routine     Referral Type:   Eval and Treat     Referral Reason:   Specialty Services Required     Requested Specialty:   Physical Therapist     Number of Visits Requested:   1       Treatment Plan:  Given the inadequate response to conservative management in the form of cortisone injection we will plan to proceed with an MRI of his right shoulder to rule out rotator cuff tear. We further provided him with another updated referral to physical therapy today. Furthermore we will request a MRI of the posterior chest wall to better assess the possibility of a nonunion of his previous rib fracture versus potential nerve compression. Based on the results of his MRI we may consider referring him to neurology versus pain clinic for consideration of possible new frequency ablation provided there is no evidence of a bony cause for his pain. We recommend this patient continue in physical therapy. A new physical therapy letter was documented in EPIC today. We will see Arianne Sheriff back in 4 weeks and/or as needed. All questions were answered to patient's satisfaction and He was encouraged to call with any further questions or concerns. Caridad Liriano is in agreement with this plan. Greater than 30 minutes were expended on all aspects of today's visit. 12/7/2022  2:26 PM    Junior Atkins MD [facility]    Orthopaedic Surgeon, Clinical Fellow  [address] [facility] and [address] [facility]     The encounter with the patient was supervised by Dr. Evie Cohn who personally examined the patient and reviewed the plan. This dictation was performed with a verbal recognition program (DRAGON) and it was checked for errors. It is possible that there are still dictated errors within this office note. If so, please bring any errors to my attention for an addendum. All efforts were made to ensure that this office note is accurate.  ______________  I was physically present and personally supervised the Orthopaedic Sports Medicine Fellow in the evaluation and development of a treatment plan for this patient. I personally interviewed the patient and performed a physical examination. In addition, I discussed the patient's condition and treatment options with them.  I have also reviewed and agree with the past medical, family and social history unless otherwise noted. All of the patient's questions were answered. Leo Polanco MD, PhD  12/7/2022

## 2023-01-04 ENCOUNTER — OFFICE VISIT (OUTPATIENT)
Dept: ORTHOPEDIC SURGERY | Age: 73
End: 2023-01-04

## 2023-01-04 VITALS — WEIGHT: 153 LBS | HEIGHT: 72 IN | BODY MASS INDEX: 20.72 KG/M2

## 2023-01-04 DIAGNOSIS — M25.511 RIGHT SHOULDER PAIN, UNSPECIFIED CHRONICITY: ICD-10-CM

## 2023-01-04 DIAGNOSIS — M75.81 ROTATOR CUFF TENDONITIS, RIGHT: Primary | ICD-10-CM

## 2023-01-04 RX ORDER — METHYLPREDNISOLONE ACETATE 40 MG/ML
80 INJECTION, SUSPENSION INTRA-ARTICULAR; INTRALESIONAL; INTRAMUSCULAR; SOFT TISSUE ONCE
Status: COMPLETED | OUTPATIENT
Start: 2023-01-04 | End: 2023-01-04

## 2023-01-04 RX ADMIN — METHYLPREDNISOLONE ACETATE 80 MG: 40 INJECTION, SUSPENSION INTRA-ARTICULAR; INTRALESIONAL; INTRAMUSCULAR; SOFT TISSUE at 17:42

## 2023-01-04 NOTE — PROGRESS NOTES
[address] [facility]  History and Physical  Shoulder Pain    Date:  2023    Name:  Mercy Segura  Address:  [address] [place] [address]   [address] [facility] [place] [address]    :  1950      Age:   81 y.o.    SSN:  [id number]      Medical Record Number:  [id number]    Reason for Visit:    No chief complaint on file. HPI:   Mercy Segura is a 81 y.o. male who presents to our office today for follow up of the right shoulder pain. This patient has been treated thus far for a rotator cuff tendinitis with possible small tear of his right shoulder for some time. He has had a corticosteroid injections in October which provided relief but then he had a return of symptoms. We asked him to get an MRI which she was able to complete and presents today to review the results. The patient reports his pain levels are 6/10 VAS . He reports that the pain is very sharp in nature. Additionally, he saw Dr. Leny Armas for some chest well pain. Pain Assessment  Location of Pain: Shoulder  Location Modifiers: Right  Severity of Pain: 6  Quality of Pain: Sharp  Frequency of Pain: Intermittent  Aggravating Factors:  (certain movements)  Limiting Behavior: Yes  Relieving Factors: Rest  Work-Related Injury: No  Are there other pain locations you wish to document?: No    Review of Systems:  A 14 point review of systems available in the scanned medical record as documented by the patient and reviewed on 2023. The review is negative with the exception of those things mentioned in the History of Present Illness and Past Medical History. Past Medical History:  Patient's medications, allergies, past medical, surgical, social and family histories were reviewed and updated as appropriate. Allergies:  No Known Allergies    Physical Exam:  There were no vitals filed for this visit.   General: Mercy Segura is a healthy and well appearing 81 y.o. male who is sitting comfortably in our office in acute distress. Skin:  There are no skin lesions, cellulitis, or extreme edema. The patient has warm and well-perfused Bilateral upper extremities with brisk capillary refill. Eyes: Extra-ocular muscles intact  Mouth: Oral mucosa moist. No perioral lesions  Pulm: Respirations unlabored and regular. Neuro: Alert and oriented x3    right Shoulder Exam:  Inspection: No gross deformities, no signs of infection. Palpation:  There is no crepitus. Tenderness to palpation over the rotator cuff footprint. Non-tender to palpation over the bicipital groove or AC joint. Active Range of Motion: Forward elevation of 150, external rotation with elbow at the side 50, internal rotation to the back is T10    Passive Range of Motion: Passively forward elevation can be further increased to 150. Strength: External rotation with resistance with elbow at the side +4/5, internal rotation with resistance with elbow at the side 5/5, Champagne toast testing +4/5, Jobes test +4/5    Special Tests: Positive Neer's, positive Chisholm no Deandre muscle deformity. Neurovascular: Sensation to light touch is intact, no motor deficits, palpable radial pulses 2+    Additional Examinations:    Examination of the contralateral extremity does not show any tenderness, deformity or injury. Range of motion is unremarkable. There is no gross instability. There are no rashes, ulcerations or lesions. Strength and tone are normal.      Radiographic:  MRI right shoulder 12/17/2022     FINDINGS:  ROTATOR CUFF: Mild confluent tendinosis of the rotator cable and conjoined tendon    with scuffing or fraying of the bursal side anterior infraspinatus (series 701, image 12). No    macro tears. Interstitial delamination with inflammation and a tiny sentinel cyst formation at the superior    infraspinatus myotendinous junction (series 501, image 16). Subscapularis and teres minor are intact.        CORACOACROMIAL ARCH: Remote resection of the distal clavicle. Type 2/curved acromion with    lateral downsloping. Normal coracoacromial ligament. Nominal peritendinobursitis. GLENOHUMERAL JOINT: No arthropathy or signs of capsulitis. Normal glenohumeral ligaments. Scant joint fluid. OSSEOUS/BONE MARROW: No fracture or dislocations. Focal synovial pitting at the medial humeral    head facet. GENERAL: Fraying of the posterosuperior labrum without displaced labral tears or paralabral    cysts. Normal biceps. Supraglenoid notch, axillary pouch and coronal space are unremarkable. CONCLUSION:   1. Mild confluent tendinosis of the rotator cable and conjoined tendon with scuffing or fraying    of the bursal side anterior infraspinatus. No macro tears. 2. Interstitial delamination with inflammation and a tiny sentinel cyst formation at the    superior infraspinatus myotendinous junction. 3. Remote resection of the distal clavicle. 4. Posterosuperior labral fraying without displaced labral tears or paralabral cysts. Assessment:  Candida Kelly is a 81 y.o. male with persistent right shoulder pain related to a bursal sided rotator cuff fraying with tendonitis. Additionally, he does not have an obvious or serious chest or chest wall process, but he may benefit from intercostal nerve ablation or something similar to that. Impression:  Encounter Diagnoses   Name Primary? Rotator cuff tendonitis, right Yes    Right shoulder pain, unspecified chronicity        Office Procedures:  No orders of the defined types were placed in this encounter. After discussing the risks and benefits of a corticosteroid injection, Perla Henry did state an understanding and gave verbal consent to proceed.   After cleansing the injection site with Chlora-prep and using aseptic techniques,  2 CC of Depo Medrol 40mg/ml and 8 CC of 1% lidocaine were injected in the right glenohumeral . He  tolerated the procedure well with no immediate adverse sequelae after the injection. A bandage was placed over the injection site. Appropriate post injections instructions were given to the patient. Plan:   We discussed both surgical and non surgical treat. Nonsurgical would include repeating the corticosteroid injection and really focusing on physical therapy to improve his symptoms. Surgery includes a small arthroscopy to do a small repair with collagen patch augmentation along with debridement. After a lengthy discussion the patient has opted to go with nonsurgical approach for now. He is also on his way down to [place] and would like to do his therapy down there. We feel that this is reasonable. He was asked to follow back up when he returns in the spring for a follow-up visit. He was in agreement with this plan and all questions were answered to the patient's satisfaction. He was encouraged to call with any questions. 1/4/2023  2:27 PM      Dino Osgood, PA-C  Orthopaedic Sports Medicine Physician Assistant    During this examination, I, Dino Osgood, PA-C, functioned as a scribe for Dr. Montana Riley. This dictation was performed with a verbal recognition program (DRAGON) and it was checked for errors. It is possible that there are still dictated errors within this office note. If so, please bring any errors to my attention for an addendum. All efforts were made to ensure that this office note is accurate.  ________________  I, Dr. Montana Riley, personally performed the services described in this documentation as described by Dino Osgood, PA-C in my presence, and it is both accurate and complete. Leo Young MD, PhD  1/4/2023

## 2023-01-04 NOTE — LETTER
Physical Therapy Rehabilitation Referral    Patient Name:  Ermias Mcclure      YOB: 1950    Diagnosis:    1. Rotator cuff tendonitis, right    2. Right shoulder pain, unspecified chronicity        Precautions:     [x] Evaluate and Treat    Post Op Instructions:  [] Continuous passive motion (CPM) [] Elbow ROM  [x] Exercise in plane of scapula  []  Strengthening     [] Pulley and instruction   [x] Home exercise program (copy to patient)   [] Sling when arm at risk  [] Sling or brace at all times   [] AAROM: Forward elevation to  140            [] AAROM: External rotation  To  40    [] Isometric external rotator strengthening [] AAROM: internal rotation: up the back  [x] Isometric abductor strengthening  [] AAROM: Internal abduction   [] Isometric internal rotator strengthening [] AAROM: cross-body adduction             Stretching:     Strengthening:  [x] Four quadrant (FE, ER, IR, CBA)  [x] Rotator cuff (ER, IR, Abd)  [] Forward Elevation    [x] External Rotators     [] External Rotation    [x] Internal Rotators  [] Internal Rotation: up/back   [x] Abductors     [] Internal Rotation: supine in abduction  [] Sleeper Stretch    [] Flexors  [] Cross-body abduction    [] Extensors  [] Pendulum (FE, Abd/Add, cw/ccw)  [x] Scapular Stabilizers   [] Wall-walking (FE, Abd)        [x] Shoulder shrugs     [] Table slides (FE)                [x] Rhomboid pinch  [] Elbow (flex, ext, pron, sup)        [] Lat.  Pull downs     [] Medial epicondylitis program       [] Forward punch   [] Lateral epicondylitis program       [] Internal rotators     [] Progressive resistive exercises  [] Bench Press        [] Bench press plus  Activities:     [] Lateral pull-downs  [] Rowing     [x] Progressive two-hand supine press  [] Stepper/Exercise bike   [x] Biceps: curls/supination  [] Swimming  [] Water exercises    Modalities:     Return to Sport:  [x] Of Choice      [] Plyometrics  [] Ultrasound     [] Rhythmic stabilization  [] Iontophoresis    [] Core strengthening   [] Moist heat     [] Sports specific program:   [] Massage         [x] Cryotherapy      [] Electrical stimulation     [] Paraffin  [] Whirlpool  [] TENS    [x] Home exercise program (copy to patient). Perform exercises for:   15     minutes    3      times/day  [x] Supervised physical therapy  Frequency: []  1x week  [x] 2x week  [] 3x week  [] Other:   Duration: [] 2 weeks   [] 4 weeks  [x] 6 weeks  [] Other:     Additional Instructions:     Leo Hogan MD, PhD

## 2023-01-09 ENCOUNTER — TELEPHONE (OUTPATIENT)
Dept: ORTHOPEDIC SURGERY | Age: 73
End: 2023-01-09

## 2023-06-29 ENCOUNTER — OFFICE VISIT (OUTPATIENT)
Dept: ORTHOPEDIC SURGERY | Age: 73
End: 2023-06-29

## 2023-06-29 VITALS — HEIGHT: 72 IN | WEIGHT: 153 LBS | BODY MASS INDEX: 20.72 KG/M2

## 2023-06-29 DIAGNOSIS — M75.81 ROTATOR CUFF TENDONITIS, RIGHT: Primary | ICD-10-CM

## 2023-06-29 DIAGNOSIS — M25.511 RIGHT SHOULDER PAIN, UNSPECIFIED CHRONICITY: ICD-10-CM

## 2023-06-29 RX ORDER — LIDOCAINE HYDROCHLORIDE 10 MG/ML
8 INJECTION, SOLUTION INFILTRATION; PERINEURAL ONCE
Status: COMPLETED | OUTPATIENT
Start: 2023-06-29 | End: 2023-06-29

## 2023-06-29 RX ORDER — METHYLPREDNISOLONE ACETATE 40 MG/ML
80 INJECTION, SUSPENSION INTRA-ARTICULAR; INTRALESIONAL; INTRAMUSCULAR; SOFT TISSUE ONCE
Status: COMPLETED | OUTPATIENT
Start: 2023-06-29 | End: 2023-06-29

## 2023-06-29 RX ADMIN — METHYLPREDNISOLONE ACETATE 80 MG: 40 INJECTION, SUSPENSION INTRA-ARTICULAR; INTRALESIONAL; INTRAMUSCULAR; SOFT TISSUE at 10:01

## 2023-06-29 RX ADMIN — LIDOCAINE HYDROCHLORIDE 8 ML: 10 INJECTION, SOLUTION INFILTRATION; PERINEURAL at 10:00

## 2023-07-18 ENCOUNTER — TELEPHONE (OUTPATIENT)
Dept: DERMATOLOGY | Age: 73
End: 2023-07-18

## 2023-07-18 NOTE — TELEPHONE ENCOUNTER
Terese Rocha calling states he wants to discuss some things regarding the patient  Mitch Sprague  xin 49 pls return call back @ [phone]

## 2023-12-06 ENCOUNTER — OFFICE VISIT (OUTPATIENT)
Dept: ORTHOPEDIC SURGERY | Age: 73
End: 2023-12-06

## 2023-12-06 VITALS — BODY MASS INDEX: 20.72 KG/M2 | WEIGHT: 153 LBS | HEIGHT: 72 IN

## 2023-12-06 DIAGNOSIS — M25.511 RIGHT SHOULDER PAIN, UNSPECIFIED CHRONICITY: ICD-10-CM

## 2023-12-06 DIAGNOSIS — M75.81 ROTATOR CUFF TENDONITIS, RIGHT: Primary | ICD-10-CM

## 2023-12-06 RX ORDER — METHYLPREDNISOLONE ACETATE 40 MG/ML
80 INJECTION, SUSPENSION INTRA-ARTICULAR; INTRALESIONAL; INTRAMUSCULAR; SOFT TISSUE ONCE
Status: COMPLETED | OUTPATIENT
Start: 2023-12-06 | End: 2023-12-06

## 2023-12-06 RX ORDER — PREGABALIN 75 MG/1
75 CAPSULE ORAL 4 TIMES DAILY
COMMUNITY
Start: 2023-11-30 | End: 2024-02-28

## 2023-12-06 RX ORDER — LIDOCAINE HYDROCHLORIDE 10 MG/ML
8 INJECTION, SOLUTION INFILTRATION; PERINEURAL ONCE
Status: COMPLETED | OUTPATIENT
Start: 2023-12-06 | End: 2023-12-06

## 2023-12-06 RX ORDER — DULOXETIN HYDROCHLORIDE 20 MG/1
20 CAPSULE, DELAYED RELEASE ORAL 2 TIMES DAILY
COMMUNITY
Start: 2023-11-10

## 2023-12-06 RX ORDER — SUMATRIPTAN 100 MG/1
TABLET, FILM COATED ORAL
COMMUNITY
Start: 2022-11-29

## 2023-12-06 RX ADMIN — METHYLPREDNISOLONE ACETATE 80 MG: 40 INJECTION, SUSPENSION INTRA-ARTICULAR; INTRALESIONAL; INTRAMUSCULAR; SOFT TISSUE at 14:49

## 2023-12-06 RX ADMIN — LIDOCAINE HYDROCHLORIDE 8 ML: 10 INJECTION, SOLUTION INFILTRATION; PERINEURAL at 14:48

## 2023-12-06 NOTE — PROGRESS NOTES
Chief Complaint    Shoulder Pain (F/U RIGHT SHOULDER)      History of Present Illness:  Mercedes Enciso is a pleasant, 72 y.o., male, here today for follow up of his right shoulder. This patient has chronic rotator cuff tendonitis with a suspected small tear of the rotator cuff. He has undergone intermittent corticosteroid injections - his most recent injection was back on 6/29/23. He states this injection wore off several weeks ago. He has had to modify his activities to avoid pain, he has modified his workouts recently. He reports no new injuries or setbacks. 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 Pain Assessment  Location of Pain: Shoulder  Location Modifiers: Right  Severity of Pain: 3  Duration of Pain: A few minutes  Frequency of Pain: Intermittent  Aggravating Factors: Other (Comment), Straightening, Stretching, Exercise  Limiting Behavior: Some  Relieving Factors: Rest, Exercise  Work-Related Injury: No  Are there other pain locations you wish to document?: No      Medical History:  Patient's medications, allergies, past medical, surgical, social and family histories were reviewed and updated as appropriate. Review of Systems  A 14 point review of systems was completed by the patient and is available in the media section of the scanned medical record and was reviewed on 12/6/2023. The review is negative with the exception of those things mentioned in the HPI and Past Medical History    Vital Signs: There were no vitals filed for this visit. General/Appearance: Alert and oriented and in no apparent distress. Skin:  There are no skin lesions, cellulitis, or extreme edema. The patient has warm and well-perfused Bilateral upper extremities with brisk capillary refill. Right Shoulder Exam:  Inspection: No gross deformities, no signs of infection. Palpation: Tenderness over the rotator cuff     Active Range of Motion:   Forward

## 2024-06-05 ENCOUNTER — OFFICE VISIT (OUTPATIENT)
Dept: SURGERY | Age: 74
End: 2024-06-05

## 2024-06-05 DIAGNOSIS — D48.5 NEOPLASM OF UNCERTAIN BEHAVIOR OF SKIN: Primary | ICD-10-CM

## 2024-06-05 RX ORDER — VALSARTAN 80 MG/1
80 TABLET ORAL DAILY
COMMUNITY

## 2024-06-05 NOTE — PROGRESS NOTES
S: pt here today c/o new onset lesion on left medial canthus, concern for BCC. Asx but not resolving.     O: on left medial canthus is a 3mm pearly centrally delled papule    AP  Neoplasm of uncertain behavior:  R/O BCC  Location: left medial canthus  - Discussed possible diagnosis. Patient agreeable to biopsy. Verbal and written consent obtained after risks (infection, bleeding, scar), benefits and alternatives explained.   - The area to be biopsied was marked with a surgical marking pen and a verbal time-out was performed.  - Local anesthesia was achieved with 1% lidocaine with epinephrine/sodium bicarbonate.   - The area was cleansed with betadine and a incisional biopsy was performed using a curved gradle scissors. Hemostasis was achieved with aluminum chloride.  A small amount of petroleum jelly was applied to the wound and a band-aid applied.   - Specimen placed in a correctly identified specimen container.  - 1 specimen(s) sent to pathology. There were no immediate complications and the patient left the office in good condition.  -  Patient educated re: risk of bleeding, infection, scar and wound care instructions reviewed.  The patient will be informed of biopsy results by phone or letter as soon as available.

## 2024-06-05 NOTE — PATIENT INSTRUCTIONS
[facility] Office Hours:    Monday-Thursday  7:30 AM-4:30 PM    Friday  9:00 AM-1:00 PM     Biopsy Wound Care Instructions  Cleanse the wound with mild soapy water daily.   Gently dry the area.  Apply Vaseline or petroleum jelly to the wound using a cotton tipped applicator.  Cover with a clean bandage.  Repeat this process until the biopsy site is healed.  If you had stitches placed, continue treating the site until the stitches are removed. Remember to make an appointment to return to have your stitches removed by our staff.  You may shower and bathe as usual.   ** Biopsy results generally take around 7 business days to come back.  If you have not heard from us by then, please call the office at [phone] between 8AM and 4PM Monday through Friday.

## 2024-06-06 LAB — DERMATOLOGY PATHOLOGY REPORT: ABNORMAL

## 2024-06-25 ENCOUNTER — PROCEDURE VISIT (OUTPATIENT)
Dept: SURGERY | Age: 74
End: 2024-06-25
Payer: MEDICARE

## 2024-06-25 VITALS — SYSTOLIC BLOOD PRESSURE: 127 MMHG | DIASTOLIC BLOOD PRESSURE: 72 MMHG

## 2024-06-25 DIAGNOSIS — C44.1191 BASAL CELL CARCINOMA OF SKIN OF LEFT UPPER EYELID, INCLUDING CANTHUS: Primary | ICD-10-CM

## 2024-06-25 PROCEDURE — 17311 MOHS 1 STAGE H/N/HF/G: CPT | Performed by: DERMATOLOGY

## 2024-06-25 NOTE — PATIENT INSTRUCTIONS
[facility] Office Hours:    Monday-Thursday  7:30 AM-4:30 PM    Friday  9:00 AM-1:00 PM     Daily Wound Care -    Caring for your wound:    1.  Keep the area absolutely dry for 24 to 48 hours.          -After 24 to 48 hours you may remove the bandage and shower.  2.  Remove the bandage and clean the wound with mild soap and water. Try to clean off crust and debris.    It is important not to allow a scab to form as scabs slow down the healing process.   3.  Apply a layer of Vaseline (or Bacitracin if your doctor recommends) to the wound area only.  4.  Cut a piece of Non-stick dressing, such as Telfa, to fit just over the wound and secure it with paper tape. If the wound is small you may use a Band-Aid.    You should continue daily wound care and keep a bandage on until new skin has grown over the entire wound    Bleeding: If bleeding occurs, DO NOT remove the bandage. Put firm pressure on the area with gauze for 20 minutes without peeking. If the bleeding continues, apply pressure for 20 minutes more.  If the bleeding does not stop after you apply pressure, call us right away. If you can’t call, go to the nearest emergency room or urgent care facility.    1.  Activity: Do not lift anything heavier than a gallon of milk for 1 week. Also avoid strenuous activity such as running, powerwalking or contact Sports.   2.  Eating and drinking: Do not drink alcohol for 48 hours after your procedure. Alcohol increases the chances of bleeding.   3.  Medicines:  -If you have discomfort, take acetaminophen (Tylenol or Extra Strength Tylenol). Follow the instructions and warning on the bottle.  -If your doctor has prescribed you an aspirin daily, please keep taking it. Do not take extra aspirin or medicines containing aspirin (such as [place] and Excedrin) for 48 hours after your procedure.    4.  Symptoms  You may have these symptoms. They are normal and should get better with time:    -Swelling.

## 2024-06-25 NOTE — PROGRESS NOTES
PRE-PROCEDURE SCREENING    Pacemaker/ICD: No  Difficulty with numbing in the past: No  Local Anesthesia Reaction/passing out: No  Latex or adhesive allergy:  No  Any history of reaction to suture or skin glue:  no  Bleeding/Clotting Disorders: No  Anticoagulant Therapy: No  Joint prosthesis: No  Artificial Heart Valve: No  Stroke or Seizures: No  Organ Transplant or Lymphoma: No  Immunosuppression: No  Respiratory Problems: No

## 2024-06-25 NOTE — PROGRESS NOTES
MOHS PROCEDURE NOTE    PHYSICIAN:  Fara Patterson MD, Who operated in two distinct and integrated capacities as the surgeon removing the tissue and as the pathologist examining the tissue.    ASSISTANT: Precious Drew RN     REFERRING PROVIDER:  Fara Patterson MD    PREOPERATIVE DIAGNOSIS: Nodular Basal Cell Carcinoma     SPECIFIC MOHS INDICATIONS:  location and need for tissue conservation    AUC SCORIN/9    POSTOPERATIVE DIAGNOSIS: SAME    LOCATION: Left medial canthus    OPERATIVE PROCEDURE:  MOHS MICROGRAPHIC SURGERY    RECONSTRUCTION OF DEFECT: Second Intention Wound Healing    PREOPERATIVE SIZE: 6x5 MM    DEFECT SIZE: 10x7 MM    LENGTH OF REPAIRED WOUND/SIZE OF FLAP/SIZE OF GRAFT:  n/a MM    ANESTHESIA:  2mL 1% lidocaine with epinephrine 1:100,000 buffered.     EBL:  MINIMAL    DURATION OF PROCEDURE:  1 HOUR    POSTOPERATIVE OBSERVATION: 30 MINUTES    SPECIMENS:  SEE MOHS MAP    COMPLICATIONS:  NONE    DESCRIPTION OF PROCEDURE:  The patient was given a mirror, as appropriate, and the biopsy site was identified, marked with a surgical marking pen, and verified by the patient.   Options for treatment were discussed and the patient was informed that Mohs surgery was the selected treatment based on its lower recurrence rate, given the features listed above, as compared to other treatment modalities such as excision, radiation, or curettage, and agreed with this treatment plan.  Risks and benefits including bruising, swelling, bleeding, infection, nerve injury, recurrence, and scarring were discussed with the patient prior to the procedure and a written consent detailing these and other risks was reviewed with the patient and signed.    There was a time out for person and procedure verification.  The surgical site was prepped with an antiseptic solution.  Application of an antiseptic solution was repeated before each surgical stage.      Stage I:  The clinically-apparent tumor was carefully defined and

## 2024-06-26 ENCOUNTER — TELEPHONE (OUTPATIENT)
Dept: SURGERY | Age: 74
End: 2024-06-26

## 2024-06-26 NOTE — TELEPHONE ENCOUNTER
The patient was in the office on 6/25/2024 for mohs located on the left medial canthus with second intention healing repair.  The patient tolerated the procedure well and left the office in good condition.    Pain level on post-operative day 1:  none    Any bleeding episode that required pressure to be held, bandage change or a call to the office or MD?  no     Any other issues?:  He was having issues keeping the tape to stick but the bandage is sticking still but he might try some other paper tape to see if it stays better. Per 's discussion with him the day of surgery, she knows it's a hard area to keep bandaged so he can just doing erythromycin ointment without having to cover it if needed.    A post-operative telephone call was placed at 2:08pm  in order to check on the patient's recovery process.  The patient reported doing well and had no complaints other than those listed above, if any.  All of the patient's questions were answered.

## 2024-07-09 ENCOUNTER — OFFICE VISIT (OUTPATIENT)
Dept: SURGERY | Age: 74
End: 2024-07-09
Payer: MEDICARE

## 2024-07-09 DIAGNOSIS — Z51.89 VISIT FOR WOUND CHECK: Primary | ICD-10-CM

## 2024-07-09 PROCEDURE — 99212 OFFICE O/P EST SF 10 MIN: CPT | Performed by: DERMATOLOGY

## 2024-07-09 PROCEDURE — 3017F COLORECTAL CA SCREEN DOC REV: CPT | Performed by: DERMATOLOGY

## 2024-07-09 PROCEDURE — 1123F ACP DISCUSS/DSCN MKR DOCD: CPT | Performed by: DERMATOLOGY

## 2024-07-09 PROCEDURE — G8420 CALC BMI NORM PARAMETERS: HCPCS | Performed by: DERMATOLOGY

## 2024-07-09 PROCEDURE — G8427 DOCREV CUR MEDS BY ELIG CLIN: HCPCS | Performed by: DERMATOLOGY

## 2024-07-09 PROCEDURE — 1036F TOBACCO NON-USER: CPT | Performed by: DERMATOLOGY

## 2024-07-10 NOTE — PROGRESS NOTES
S: The patient is here for wound check s/p Mohs surgery on the left medial canthus with second intent wound healing, 2 week(s) ago.  The patient has no complaints.  O:  The wound has minimal fibrin.  No erythema/purulence/pain.      A/P:  Chronic problem: is not at treatment goal:  open wound of the let medial canthus s/p Mohs surgery.  Status:  The wound is healing well by second intention.  No s/sx infection/bleeding.     The area was cleansed with a gentle cleanser, a small amount of Aquaphor and a non-stick dressing applied.   Detailed second intention wound care instructions reviewed with the patient including daily gentle cleansing with mild cleanser such as cetaphil, application of topical petrolatum or aquaphor and wound coverage. Reminder to remove excessive fibrin as necessary, best after cleansing when fibrin is less adherent.  Pt education on how to perform this.  Healing time discussed.  The patient is scheduled for f/u wound check as needed.    OTC Meds:  continue aquaphor daily  Prescription Meds:  no  Co-morbid conditions affecting healing (I.e. tobacco, diabetes, pvd, peripheral edema, immunosuppression):  no

## 2024-12-20 ENCOUNTER — INITIAL CONSULT (OUTPATIENT)
Dept: SURGERY | Age: 74
End: 2024-12-20
Payer: MEDICARE

## 2024-12-20 DIAGNOSIS — C44.1122 BASAL CELL CARCINOMA (BCC) OF RIGHT LOWER EYELID: Primary | ICD-10-CM

## 2024-12-20 PROCEDURE — G8421 BMI NOT CALCULATED: HCPCS | Performed by: DERMATOLOGY

## 2024-12-20 PROCEDURE — 1159F MED LIST DOCD IN RCRD: CPT | Performed by: DERMATOLOGY

## 2024-12-20 PROCEDURE — 3017F COLORECTAL CA SCREEN DOC REV: CPT | Performed by: DERMATOLOGY

## 2024-12-20 PROCEDURE — 1123F ACP DISCUSS/DSCN MKR DOCD: CPT | Performed by: DERMATOLOGY

## 2024-12-20 PROCEDURE — G8427 DOCREV CUR MEDS BY ELIG CLIN: HCPCS | Performed by: DERMATOLOGY

## 2024-12-20 PROCEDURE — 1036F TOBACCO NON-USER: CPT | Performed by: DERMATOLOGY

## 2024-12-20 PROCEDURE — 99214 OFFICE O/P EST MOD 30 MIN: CPT | Performed by: DERMATOLOGY

## 2024-12-20 PROCEDURE — G8484 FLU IMMUNIZE NO ADMIN: HCPCS | Performed by: DERMATOLOGY

## 2024-12-20 NOTE — PATIENT INSTRUCTIONS
[facility] Office Hours:    Monday-Thursday  7:30 AM-4:30 PM    Friday  9:00 AM-1:00 PM     Biopsy Wound Care Instructions  Cleanse the wound with mild soapy water daily.   Gently dry the area.  Apply Vaseline or petroleum jelly to the wound using a cotton tipped applicator.  Cover with a clean bandage.  Repeat this process until the biopsy site is healed.  If you had stitches placed, continue treating the site until the stitches are removed. Remember to make an appointment to return to have your stitches removed by our staff.  You may shower and bathe as usual.   ** Biopsy results generally take around 7 business days to come back.  If you have not heard from us by then, please call the office at [phone] between 8AM and 4PM Monday through Friday.

## 2024-12-20 NOTE — PROGRESS NOTES
S:  location of Lesion: #1 Right preauricular superficial and nodular bcc    #2 Right medial canthus nodular BCC    Symptoms:  no  Date of biopsy: 12/18/24  Previous known tx: no    O: on the right preauricular there is an ill defined hypopigmented ulcerated 6 x6 bx site with surrounding skin changes measuring approx 14 x 10 mm plaque  On the right medial canthus/lower lid there is a 4 x 3 serous crust c/w previous bx and surrounding area with ill defined erythema and focal area of superficial crusting    AP:  NUMBER AND COMPLEXITY OF PROBLEMS ADDRESSED  Acute illness or injury: yes - BCC x  2  Chronic illness:  no  Treatment: Decision for Mohs surgery with possible local flap or graft reconstruction on right medial canthal lesion.    Due to the ill defined nature of the lesion on right medial canthus and concern for encroachment on canthal apparatus or lower lid margin which would impact reconstructive options (need to involve oculoplastics), a shave biopsy was performed on an area superior to previously done biopsy in the hopes of better defining surgical margins and need for consultation with oculoplastics prior to Mohs.    Neoplasm of uncertain behavior:  R/O BCC  Location: right superior medial canthus  - Discussed possible diagnosis. Patient agreeable to biopsy. Verbal and written consent obtained after risks (infection, bleeding, scar), benefits and alternatives explained.   - The area to be biopsied was marked with a surgical marking pen and a verbal time-out was performed.  - Local anesthesia was achieved with 1% lidocaine with epinephrine/sodium bicarbonate.   - The area was cleansed with alcohol and a tangential shave biopsy was performed using a curved gradle scissors. Hemostasis was achieved with aluminum chloride.  A small amount of petroleum jelly was applied to the wound and a band-aid applied.   - Specimen placed in a correctly identified specimen container.  - 1 specimen(s) sent to pathology.

## 2024-12-23 LAB — DERMATOLOGY PATHOLOGY REPORT: ABNORMAL

## 2025-01-09 ENCOUNTER — PROCEDURE VISIT (OUTPATIENT)
Dept: SURGERY | Age: 75
End: 2025-01-09
Payer: MEDICARE

## 2025-01-09 VITALS — HEART RATE: 88 BPM | DIASTOLIC BLOOD PRESSURE: 68 MMHG | SYSTOLIC BLOOD PRESSURE: 132 MMHG

## 2025-01-09 DIAGNOSIS — C44.319 BASAL CELL CARCINOMA OF RIGHT CHEEK: Primary | ICD-10-CM

## 2025-01-09 PROCEDURE — 12052 INTMD RPR FACE/MM 2.6-5.0 CM: CPT | Performed by: DERMATOLOGY

## 2025-01-09 PROCEDURE — 17311 MOHS 1 STAGE H/N/HF/G: CPT | Performed by: DERMATOLOGY

## 2025-01-09 PROCEDURE — 17312 MOHS ADDL STAGE: CPT | Performed by: DERMATOLOGY

## 2025-01-09 NOTE — PROGRESS NOTES
MOHS PROCEDURE NOTE    PHYSICIAN:  Fara Patterson MD, Who operated in two distinct and integrated capacities as the surgeon removing the tissue and as the pathologist examining the tissue.    ASSISTANT: Oliver Nation RN, Damián Dugan RN and JOSE Mccoy, RN    REFERRING PROVIDER:  Tadeo Guadalupe MD    PREOPERATIVE DIAGNOSIS: Superficial and Nodular Basal Cell Carcinoma    SPECIFIC MOHS INDICATIONS:  location, clinically ill-defined borders, and need for tissue conservation    AUC SCORIN/9    POSTOPERATIVE DIAGNOSIS: SAME    LOCATION: right preauricular    OPERATIVE PROCEDURE:  MOHS MICROGRAPHIC SURGERY    RECONSTRUCTION OF DEFECT: Intermediate layered closure    PREOPERATIVE SIZE: 17x7 MM    DEFECT SIZE: 26x12 MM    LENGTH OF REPAIRED WOUND/SIZE OF FLAP/SIZE OF GRAFT:  41 MM    ANESTHESIA:  7.5mL 1% lidocaine with epinephrine 1:100,000 buffered.     EBL:  MINIMAL    DURATION OF PROCEDURE:  3 HOURS    POSTOPERATIVE OBSERVATION: 1 HOUR    SPECIMENS:  SEE MOHS MAP    COMPLICATIONS:  NONE    DESCRIPTION OF PROCEDURE:  The patient was given a mirror, as appropriate, and the biopsy site was identified, marked with a surgical marking pen, and verified by the patient.   Options for treatment were discussed and the patient was informed that Mohs surgery was the selected treatment based on its lower recurrence rate, given the features listed above, as compared to other treatment modalities such as excision, radiation, or curettage, and agreed with this treatment plan.  Risks and benefits including bruising, swelling, bleeding, infection, nerve injury, recurrence, and scarring were discussed with the patient prior to the procedure and a written consent detailing these and other risks was reviewed with the patient and signed.    There was a time out for person and procedure verification.  The surgical site was prepped with an antiseptic solution.  Application of an antiseptic solution was repeated before each

## 2025-01-09 NOTE — PATIENT INSTRUCTIONS
[facility] Office Hours:    Monday-Thursday  7:30 AM-4:30 PM    Friday  9:00 AM-1:00 PM     POST-OPERATIVE CARE FOR LIQUID SKIN ADHESIVE               Bandage change after 24 hours    During your procedure today, a liquid skin adhesive was used to close the wound. You do not have to have stiches removed. If has a clear to light purple shiny surface. You do not have to have this removed. It will dissolve (melt away) in about 1-2 weeks. Please follow these instructions to help you recover from your procedure and help your wound heal.    CARING FOR YOUR SURGICAL SITE  The bandage should remain on and completely dry for 24 hours. Do NOT get the bandage wet.  After the first 24 hours, gently remove the remaining part of the bandage. It can be helpful to moisten the bandage edges in the shower.   Be gentle around the area of the wound. Do not scrub, rub or pick at the skin glue. It will gradually dissolve in 1-2 weeks.   Do not shave directly over the wound for one week. You can shave around the area.   After one week you can start cleaning the area gently and resume all normal activity. No further restrictions.  Use Sunscreen with SPF of at least 30 on the area around the wound.    If the dressing comes off or if you have questions, or concerns about the dressing, please call the office for instructions!    POST OPERATIVE INSTRUCTIONS    Activity: it is recommended to avoid strenuous activity such as lifting, pushing, pulling, running, power walking or contact sports for at least 2-7 days or as recommended by your provider.  Eating and drinking: Do not drink alcohol for 48 hours after your procedure. Alcohol increases the chances of bleeding.  Medicines   -If you have discomfort, take Acetaminophen (Tylenol or Extra Strength Tylenol). Follow the instructions and warning on the bottle.    Bleeding: If bleeding occurs, Put firm pressure on the area with gauze for 20 minutes without peeking. If the

## 2025-01-10 ENCOUNTER — TELEPHONE (OUTPATIENT)
Dept: SURGERY | Age: 75
End: 2025-01-10

## 2025-01-10 NOTE — TELEPHONE ENCOUNTER
The patient was in the office on 1/9/2025 for mohs surgery located on the right preauricular area with ILC repair.  The patient tolerated the procedure well and left the office in good condition.    Pain level on post-operative day 1:  level of pain (1-10, 10 severe), 2/10  taking tylenol    Any bleeding episode that required pressure to be held, bandage change or a call to the office or MD?  no     Any other issues?:  no    A post-operative telephone call was placed at 0990 in order to check on the patient's recovery process.  The patient reported doing well and had no complaints other than those listed above, if any.  All of the patient's questions were answered.

## 2025-02-04 ENCOUNTER — TELEPHONE (OUTPATIENT)
Dept: SURGERY | Age: 75
End: 2025-02-04

## 2025-02-04 NOTE — TELEPHONE ENCOUNTER
Called & spoke to Amee in Dr. Olvera's offc  - Pete HOOK - advised PT is seeing Dr. Olvera in the near future and I an faxing BX results X2 - 1) inferior medial canthus  - Dr. Guadalupe 2) superior medial canthus - Dr. Patterson and also emailing color photos of the RT medial canthus.    Abnormal results added in error/bjj

## 2025-05-15 ENCOUNTER — TELEPHONE (OUTPATIENT)
Age: 75
End: 2025-05-15

## 2025-05-15 NOTE — TELEPHONE ENCOUNTER
Meri, can you schedule Dr. Garcia and his wife to see me later this summer?  A Friday will be fine to use if you need it.

## 2025-05-29 NOTE — TELEPHONE ENCOUNTER
Spoke to Dr. Garcia-schedule he and his wife on 8/27/25 and placed on cancellation list if anything opens up between now and the first week of July.

## 2025-08-27 ENCOUNTER — OFFICE VISIT (OUTPATIENT)
Age: 75
End: 2025-08-27
Payer: MEDICARE

## 2025-08-27 DIAGNOSIS — L57.0 ACTINIC KERATOSIS: ICD-10-CM

## 2025-08-27 DIAGNOSIS — L21.9 SEBORRHEIC DERMATITIS: Primary | ICD-10-CM

## 2025-08-27 DIAGNOSIS — L56.8 ACTINIC CHEILITIS: ICD-10-CM

## 2025-08-27 DIAGNOSIS — Z85.828 HISTORY OF BASAL CELL CARCINOMA: ICD-10-CM

## 2025-08-27 PROCEDURE — 3017F COLORECTAL CA SCREEN DOC REV: CPT | Performed by: DERMATOLOGY

## 2025-08-27 PROCEDURE — 1036F TOBACCO NON-USER: CPT | Performed by: DERMATOLOGY

## 2025-08-27 PROCEDURE — 1160F RVW MEDS BY RX/DR IN RCRD: CPT | Performed by: DERMATOLOGY

## 2025-08-27 PROCEDURE — 1123F ACP DISCUSS/DSCN MKR DOCD: CPT | Performed by: DERMATOLOGY

## 2025-08-27 PROCEDURE — 99202 OFFICE O/P NEW SF 15 MIN: CPT | Performed by: DERMATOLOGY

## 2025-08-27 PROCEDURE — 1159F MED LIST DOCD IN RCRD: CPT | Performed by: DERMATOLOGY

## 2025-08-27 PROCEDURE — G8421 BMI NOT CALCULATED: HCPCS | Performed by: DERMATOLOGY

## 2025-08-27 PROCEDURE — G8427 DOCREV CUR MEDS BY ELIG CLIN: HCPCS | Performed by: DERMATOLOGY

## 2025-08-27 PROCEDURE — 99203 OFFICE O/P NEW LOW 30 MIN: CPT | Performed by: DERMATOLOGY

## 2025-08-27 RX ORDER — KETOCONAZOLE 20 MG/ML
SHAMPOO, SUSPENSION TOPICAL
Qty: 120 ML | Refills: 3 | Status: SHIPPED | OUTPATIENT
Start: 2025-08-27

## 2025-08-27 RX ORDER — HYDROCORTISONE 25 MG/ML
LOTION TOPICAL
Qty: 59 ML | Refills: 2 | Status: SHIPPED | OUTPATIENT
Start: 2025-08-27

## 2025-08-27 RX ORDER — ONDANSETRON 4 MG/1
4 TABLET, ORALLY DISINTEGRATING ORAL EVERY 8 HOURS PRN
COMMUNITY
Start: 2025-06-24

## 2025-08-27 RX ORDER — IMIQUIMOD 12.5 MG/.25G
CREAM TOPICAL
COMMUNITY
Start: 2025-07-19

## (undated) DEVICE — FORCEPS BX 240CM 2.4MM L NDL RAD JAW 4 M00513334